# Patient Record
Sex: FEMALE | Race: WHITE | Employment: UNEMPLOYED | URBAN - METROPOLITAN AREA
[De-identification: names, ages, dates, MRNs, and addresses within clinical notes are randomized per-mention and may not be internally consistent; named-entity substitution may affect disease eponyms.]

---
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
## 2021-07-08 ENCOUNTER — HOSPITAL ENCOUNTER (EMERGENCY)
Age: 21
Discharge: HOME | End: 2021-07-08
Attending: EMERGENCY MEDICINE | Admitting: EMERGENCY MEDICINE

## 2021-07-08 ENCOUNTER — APPOINTMENT (OUTPATIENT)
Dept: GENERAL RADIOLOGY | Age: 21
End: 2021-07-08
Attending: PHYSICIAN ASSISTANT

## 2021-07-08 VITALS
WEIGHT: 160 LBS | BODY MASS INDEX: 30.21 KG/M2 | OXYGEN SATURATION: 100 % | TEMPERATURE: 99.2 F | HEART RATE: 84 BPM | RESPIRATION RATE: 18 BRPM | DIASTOLIC BLOOD PRESSURE: 72 MMHG | SYSTOLIC BLOOD PRESSURE: 117 MMHG | HEIGHT: 61 IN

## 2021-07-08 DIAGNOSIS — M79.675 TOE PAIN, LEFT: Primary | ICD-10-CM

## 2021-07-08 PROCEDURE — 99282 EMERGENCY DEPT VISIT SF MDM: CPT

## 2021-07-08 PROCEDURE — 73630 X-RAY EXAM OF FOOT: CPT

## 2021-07-08 RX ORDER — CEPHALEXIN 500 MG/1
500 CAPSULE ORAL 4 TIMES DAILY
Qty: 28 CAPSULE | Refills: 0 | Status: SHIPPED | OUTPATIENT
Start: 2021-07-08 | End: 2021-07-15

## 2021-07-08 NOTE — ED TRIAGE NOTES
Food Poisoning (Adult)  Food poisoning is illness that is passed along in food. It usually occurs from 1 to 24 hours after eating food that has spoiled.  Food poisoning can occur when you eat food or drink that contains viruses, bacteria, parasites, or to Pt ambulatory unassisted to triage with mask in place. Pt complains of L great toe pain. States her front door slammed on her toe x2 days ago. · Wash your hands after using cutting boards, counter-tops, and knives or other utensils that have been in contact with raw foods. · Wash and then peel fruits and vegetables. · Keep uncooked meats away from cooked and ready-to-eat foods.   · Use a food th · Plain noodles, rice, mashed potatoes, chicken noodle soup, or rice soup  · Unsweetened canned fruit (no pineapple)  · Bananas  As you recover:  · Limit fat intake to less than 15 grams per day.  Don’t eat margarine, butter, oils, mayonnaise, sauces, Karri Bible Gastritis is a painful inflammation of the stomach lining. It has a number of causes. Gastritis and its symptoms can be relieved with treatment. Work with your healthcare provider to find ways to treat your symptoms.   The Stomach  To digest the food you ea Nausea is feeling that you need to throw up. Throwing up occurs when your body forces food that is in your stomach out through your mouth. Nausea and vomiting are symptoms that are caused by many things.  They can happen when a condition or disease, medicin Nausea or vomiting can often be prevented or controlled with medicines (antiemetics). Your doctor may give you antiemetics before or after treatment if you are getting chemotherapy or other medical treatments that cause nausea or vomiting.   Eating tips  · · Soups. Clear broth. · Desserts. Plain gelatin, popsicles, and fruit juice bars. As you feel better, you may add 6 to 8 ounces of yogurt per day.  If you have diarrhea, don't have foods or drinks that contain sugar, high-fructose corn syrup, or sugar alco

## 2021-07-08 NOTE — Clinical Note
[address] [facility] EMERGENCY DEPT   [facility] [id number] [phone]    Work/School Note    Date: 7/8/2021    To Whom It May concern:      Amy Maravilla was seen and treated today in the emergency room by the following provider(s):  Attending Provider: Kathi Antonio MD  Physician Assistant: Carla Kumar. Mariana Arnold is excused from work/school on 07/08/21. She is clear to return to work/school on 07/09/21.         Sincerely,          DELGADO Patel

## 2021-07-08 NOTE — ED PROVIDER NOTES
Patient is a 50-year-old female who presents to the emergency room with chief complaint of left great toe pain after she hit it with the screen door at her home approximately 3 days ago. She cleaned the wound with hydrogen peroxide. Has had some increasing pain in the area. Described as throbbing. Worse with palpation, weightbearing. Denies fever, chills, foot pain, ankle pain. Past Medical History:   Diagnosis Date    ADD (attention deficit disorder) 1/19/2017    ADHD (attention deficit hyperactivity disorder) 1/19/2017    Anemia, iron deficiency 1/19/2017    H/O seasonal allergies     Pyuria     Scoliosis 1/19/2017    Seasonal allergies 1/19/2017       No past surgical history on file.       Family History:   Problem Relation Age of Onset    Diabetes Mother     Attention Deficit Hyperactivity Disorder Brother     Diabetes Maternal Aunt     Cancer Maternal Uncle         Lung Cancer    Other Other         Family Hx of Scoliosis    Diabetes Other         uncles and cousin have diabetes    Attention Deficit Hyperactivity Disorder Nephew     Attention Deficit Hyperactivity Disorder Sister        Social History     Socioeconomic History    Marital status: SINGLE     Spouse name: Not on file    Number of children: Not on file    Years of education: Not on file    Highest education level: Not on file   Occupational History    Not on file   Tobacco Use    Smoking status: Never Smoker    Smokeless tobacco: Never Used   Substance and Sexual Activity    Alcohol use: Not on file    Drug use: No    Sexual activity: Yes   Other Topics Concern    Not on file   Social History Narrative    Not on file     Social Determinants of Health     Financial Resource Strain:     Difficulty of Paying Living Expenses:    Food Insecurity:     Worried About [address] in the Last Year:     [address] in the Last Year:    Transportation Needs:     Lack of Transportation (Medical):    Miah Benedict Lack of Transportation (Non-Medical):    Physical Activity:     Days of Exercise per Week:     Minutes of Exercise per Session:    Stress:     Feeling of Stress :    Social Connections:     Frequency of Communication with Friends and Family:     Frequency of Social Gatherings with Friends and Family:     Attends Hinduism Services:     Active Member of Clubs or Organizations:     Attends Club or Organization Meetings:     Marital Status:    Intimate Partner Violence:     Fear of Current or Ex-Partner:     Emotionally Abused:     Physically Abused:     Sexually Abused: ALLERGIES: Patient has no known allergies. Review of Systems   Constitutional: Negative for chills and fever. HENT: Negative for facial swelling. Respiratory: Negative for chest tightness and shortness of breath. Cardiovascular: Negative for chest pain. Gastrointestinal: Negative for abdominal pain, nausea and vomiting. Musculoskeletal: Positive for arthralgias. Negative for myalgias. Neurological: Negative for headaches. Psychiatric/Behavioral: Negative for confusion. All other systems reviewed and are negative. Vitals:    07/08/21 1324   BP: 117/72   Pulse: 84   Resp: 18   Temp: 99.2 °F (37.3 °C)   SpO2: 100%   Weight: 72.6 kg (160 lb)   Height: 5' 1\" (1.549 m)            Physical Exam  Vitals and nursing note reviewed. Constitutional:       Appearance: Normal appearance. HENT:      Head: Normocephalic and atraumatic. Mouth/Throat:      Mouth: Mucous membranes are moist.   Eyes:      Pupils: Pupils are equal, round, and reactive to light. Cardiovascular:      Rate and Rhythm: Normal rate and regular rhythm. Pulmonary:      Effort: No respiratory distress. Breath sounds: Normal breath sounds. Abdominal:      Palpations: Abdomen is soft. Tenderness: There is no abdominal tenderness. There is no guarding. Feet:      Comments: Partially avulsed left great toenail.   Skin: General: Skin is warm and dry. Neurological:      Mental Status: She is alert and oriented to person, place, and time. Mental status is at baseline.    Psychiatric:         Mood and Affect: Mood normal.          MDM  Number of Diagnoses or Management Options  Toe pain, left: minor  Risk of Complications, Morbidity, and/or Mortality  Presenting problems: minimal  Diagnostic procedures: minimal  Management options: minimal    Patient Progress  Patient progress: improved         Procedures

## 2021-07-08 NOTE — ED NOTES
I have reviewed discharge instructions with the patient. The patient verbalized understanding. Patient left ED via Discharge Method: ambulatory to Home with self    Opportunity for questions and clarification provided. Patient given 1 scripts. To continue your aftercare when you leave the hospital, you may receive an automated call from our care team to check in on how you are doing. This is a free service and part of our promise to provide the best care and service to meet your aftercare needs.  If you have questions, or wish to unsubscribe from this service please call [phone]. Thank you for Choosing our [facility] Emergency Department.

## 2021-12-24 ENCOUNTER — HOSPITAL ENCOUNTER (EMERGENCY)
Age: 21
Discharge: HOME | End: 2021-12-24
Attending: EMERGENCY MEDICINE

## 2021-12-24 VITALS
SYSTOLIC BLOOD PRESSURE: 128 MMHG | WEIGHT: 160 LBS | RESPIRATION RATE: 16 BRPM | HEART RATE: 92 BPM | TEMPERATURE: 98.4 F | DIASTOLIC BLOOD PRESSURE: 84 MMHG | HEIGHT: 60 IN | OXYGEN SATURATION: 98 % | BODY MASS INDEX: 31.41 KG/M2

## 2021-12-24 DIAGNOSIS — J02.9 SORE THROAT: Primary | ICD-10-CM

## 2021-12-24 LAB
COVID-19 RAPID TEST, COVR: NOT DETECTED
SOURCE, COVRS: NORMAL
STREP,MOLECULAR STRPM: NOT DETECTED

## 2021-12-24 PROCEDURE — 99283 EMERGENCY DEPT VISIT LOW MDM: CPT

## 2021-12-24 PROCEDURE — 74011250637 HC RX REV CODE- 250/637: Performed by: NURSE PRACTITIONER

## 2021-12-24 PROCEDURE — 87635 SARS-COV-2 COVID-19 AMP PRB: CPT

## 2021-12-24 PROCEDURE — 87651 STREP A DNA AMP PROBE: CPT

## 2021-12-24 RX ORDER — IBUPROFEN 800 MG/1
800 TABLET ORAL
Status: COMPLETED | OUTPATIENT
Start: 2021-12-24 | End: 2021-12-24

## 2021-12-24 RX ADMIN — IBUPROFEN 800 MG: 800 TABLET, FILM COATED ORAL at 11:55

## 2021-12-24 NOTE — ED NOTES
I have reviewed discharge instructions with the patient. The patient verbalized understanding. Patient left ED via Discharge Method: ambulatory to Home with mother  Opportunity for questions and clarification provided. Patient given 0 scripts. To continue your aftercare when you leave the hospital, you may receive an automated call from our care team to check in on how you are doing. This is a free service and part of our promise to provide the best care and service to meet your aftercare needs.  If you have questions, or wish to unsubscribe from this service please call [phone]. Thank you for Choosing our [facility] Emergency Department.

## 2021-12-24 NOTE — Clinical Note
[address] [facility] EMERGENCY DEPT  [address] [facility] [address] [phone]    Work/School Note    Date: 12/24/2021    To Whom It May concern:    Amy Maravilla was seen and treated today in the emergency room by the following provider(s):  Attending Provider: Eulalia Brown MD  Nurse Practitioner: Joe Tucker NP. Stephany Baptiste is excused from work/school on 12/24/21 and 12/25/21. She is medically clear to return to work/school on 12/26/2021.        Sincerely,          Halie Charles NP

## 2021-12-24 NOTE — ED TRIAGE NOTES
Patient is ambulatory to triage with mask in place with complaint of sore throat since Wednesday. Patient is able to tolerate secretions. Patient denies Chest pain, SHOB, body aches, fever, and chills.

## 2021-12-24 NOTE — DISCHARGE INSTRUCTIONS
Take over-the-counter Tylenol or ibuprofen if needed for discomfort. Use warm salt water gargles, sore throat spray, or sore throat lozenges to help relieve sore throat. Return to the emergency department for any new, worsening, or concerning symptoms.

## 2021-12-24 NOTE — ED PROVIDER NOTES
81-year-old female who presents to the emergency department for complaint of sore throat since Wednesday. She also reports she has had a mild cough and body aches. She denies any difficulty swallowing, shortness of breath, body aches, fever, chills, nausea, vomiting, diarrhea, chest pain, or abdominal pain. The history is provided by the patient. Sore Throat  This is a new problem. The current episode started 2 days ago. The problem occurs constantly. The problem has not changed since onset. Pertinent negatives include no abdominal pain and no shortness of breath. She has tried nothing for the symptoms. Past Medical History:   Diagnosis Date    ADD (attention deficit disorder) 1/19/2017    ADHD (attention deficit hyperactivity disorder) 1/19/2017    Anemia, iron deficiency 1/19/2017    H/O seasonal allergies     Pyuria     Scoliosis 1/19/2017    Seasonal allergies 1/19/2017       No past surgical history on file.       Family History:   Problem Relation Age of Onset    Diabetes Mother     Attention Deficit Hyperactivity Disorder Brother     Diabetes Maternal Aunt     Cancer Maternal Uncle         Lung Cancer    Other Other         Family Hx of Scoliosis    Diabetes Other         uncles and cousin have diabetes    Attention Deficit Hyperactivity Disorder Nephew     Attention Deficit Hyperactivity Disorder Sister        Social History     Socioeconomic History    Marital status: SINGLE     Spouse name: Not on file    Number of children: Not on file    Years of education: Not on file    Highest education level: Not on file   Occupational History    Not on file   Tobacco Use    Smoking status: Never Smoker    Smokeless tobacco: Never Used   Substance and Sexual Activity    Alcohol use: Not on file    Drug use: No    Sexual activity: Yes   Other Topics Concern    Not on file   Social History Narrative    Not on file     Social Determinants of Health     Financial Resource Strain:  Difficulty of Paying Living Expenses: Not on file   Food Insecurity:     Worried About Running Out of Food in the Last Year: Not on file    Ran Out of Food in the Last Year: Not on file   Transportation Needs:     Lack of Transportation (Medical): Not on file    Lack of Transportation (Non-Medical): Not on file   Physical Activity:     Days of Exercise per Week: Not on file    Minutes of Exercise per Session: Not on file   Stress:     Feeling of Stress : Not on file   Social Connections:     Frequency of Communication with Friends and Family: Not on file    Frequency of Social Gatherings with Friends and Family: Not on file    Attends Adventist Services: Not on file    Active Member of [address] [place] [address] [employer] or Organizations: Not on file    Attends Club or Organization Meetings: Not on file    Marital Status: Not on file   Intimate Partner Violence:     Fear of Current or Ex-Partner: Not on file    Emotionally Abused: Not on file    Physically Abused: Not on file    Sexually Abused: Not on file   Housing Stability:     Unable to Pay for Housing in the Last Year: Not on file    Number of [place] in the Last Year: Not on file    Unstable Housing in the Last Year: Not on file         ALLERGIES: Patient has no known allergies. Review of Systems   Constitutional: Negative for chills and fever. HENT: Positive for rhinorrhea and sore throat. Respiratory: Positive for cough. Negative for shortness of breath. Gastrointestinal: Negative for abdominal pain, diarrhea, nausea and vomiting. Musculoskeletal: Positive for myalgias. All other systems reviewed and are negative. Vitals:    12/24/21 1035 12/24/21 1352   BP: 133/73 128/84   Pulse: 99 92   Resp: 18 16   Temp: 98.7 °F (37.1 °C) 98.4 °F (36.9 °C)   SpO2: 99% 98%   Weight: 72.6 kg (160 lb)    Height: 5' (1.524 m)             Physical Exam  Vitals and nursing note reviewed. Constitutional:       General: She is not in acute distress. Appearance: Normal appearance. She is well-developed. She is not ill-appearing, toxic-appearing or diaphoretic. HENT:      Head: Normocephalic and atraumatic. Right Ear: Tympanic membrane, ear canal and external ear normal.      Left Ear: Tympanic membrane, ear canal and external ear normal.      Mouth/Throat:      Mouth: Mucous membranes are moist.      Pharynx: Oropharynx is clear. Uvula midline. Posterior oropharyngeal erythema present. No pharyngeal swelling, oropharyngeal exudate or uvula swelling. Tonsils: No tonsillar exudate or tonsillar abscesses. Eyes:      General: No scleral icterus. Extraocular Movements: Extraocular movements intact. Conjunctiva/sclera: Conjunctivae normal.   Cardiovascular:      Rate and Rhythm: Normal rate. Heart sounds: Normal heart sounds. Pulmonary:      Effort: Pulmonary effort is normal. No respiratory distress. Abdominal:      General: Abdomen is flat. There is no distension. Musculoskeletal:         General: Normal range of motion. Cervical back: Normal range of motion and neck supple. No rigidity. Skin:     General: Skin is warm and dry. Capillary Refill: Capillary refill takes less than 2 seconds. Neurological:      General: No focal deficit present. Mental Status: She is alert and oriented to person, place, and time. Psychiatric:         Mood and Affect: Mood normal.         Behavior: Behavior normal.         Thought Content: Thought content normal.         Judgment: Judgment normal.          MDM  Number of Diagnoses or Management Options  Sore throat: new and requires workup  Diagnosis management comments: Overall well-appearing 50-year-old female who presents emergency department today with complaint of sore throat. Physical exam is unremarkable. Lung sounds are clear. Patient is speaking in full sentences and tolerating oral secretions without difficulty. Mild erythema noted to posterior oropharynx.   Covid and strep swabs are negative today. Suspicious for viral etiology. Supportive treatment discussed and encouraged. I have discussed the results of all labs, procedures, radiographs, and/or treatments with the patient and available family members. Erika Erickson is agreed upon by the patient and the patient is ready for discharge.  Questions about treatment in the ED and differential diagnosis of presenting condition were answered. Carlos Manuel Phelps was given verbal discharge instructions including, but not limited to, importance of returning to the emergency department for any concern of worsening or continued symptoms.  Instructions were given to follow up with a primary care provider or specialist within 1-2 days. Vanice Karlie effects of medications, if prescribed, were discussed and patient was advised to refrain from significant physical activity until followed up by primary care physician and to not drive or operate heavy machinery after taking any sedating substances.      Brenden Arellano NP; 12/24/2021 @4:47 PM Voice dictation software was used during the making of  this note. This software is not perfect and grammatical and other typographical errors  may be present. This note has not been proofread for errors.          Amount and/or Complexity of Data Reviewed  Clinical lab tests: reviewed    Risk of Complications, Morbidity, and/or Mortality  Presenting problems: low  Diagnostic procedures: low  Management options: low    Patient Progress  Patient progress: improved         Procedures

## 2022-01-21 ENCOUNTER — HOSPITAL ENCOUNTER (EMERGENCY)
Age: 22
Discharge: HOME | End: 2022-01-21
Attending: EMERGENCY MEDICINE
Payer: MEDICAID

## 2022-01-21 VITALS
SYSTOLIC BLOOD PRESSURE: 120 MMHG | TEMPERATURE: 97.9 F | WEIGHT: 173 LBS | HEIGHT: 61 IN | BODY MASS INDEX: 32.66 KG/M2 | HEART RATE: 74 BPM | DIASTOLIC BLOOD PRESSURE: 72 MMHG | OXYGEN SATURATION: 98 %

## 2022-01-21 DIAGNOSIS — U07.1 COVID-19: Primary | ICD-10-CM

## 2022-01-21 LAB
COVID-19 RAPID TEST, COVR: DETECTED
FLUAV AG NPH QL IA: NEGATIVE
FLUBV AG NPH QL IA: NEGATIVE
SOURCE, COVRS: ABNORMAL
SPECIMEN SOURCE: NORMAL

## 2022-01-21 PROCEDURE — 87635 SARS-COV-2 COVID-19 AMP PRB: CPT

## 2022-01-21 PROCEDURE — 87804 INFLUENZA ASSAY W/OPTIC: CPT

## 2022-01-21 PROCEDURE — 99282 EMERGENCY DEPT VISIT SF MDM: CPT

## 2022-01-21 NOTE — ED PROVIDER NOTES
HPI   Pleasant 27-year-old female presents to the ED with complaint of sore throat, nonproductive cough x5 days. Pleasant very well-appearing she appears to be in no acute distress. Talkative laughing in the ED. She denies difficulty swallowing, despite that written in triage note by triage nurse. States she has no difficulty swallowing, is eating and drinking normally, has increased pain with swallowing. No drooling, no hoarseness no voice change. Denies difficulty breathing, shortness of breath, chest pain or tightness, neck pain or stiffness, ear pain, facial pain, rhinorrhea or nasal congestion, nausea or vomiting, abdominal pain, diarrhea, urinary complaint, concern for pregnancy. Afebrile and hemodynamically stable. Past Medical History:   Diagnosis Date    ADD (attention deficit disorder) 1/19/2017    ADHD (attention deficit hyperactivity disorder) 1/19/2017    Anemia, iron deficiency 1/19/2017    H/O seasonal allergies     Pyuria     Scoliosis 1/19/2017    Seasonal allergies 1/19/2017       No past surgical history on file.       Family History:   Problem Relation Age of Onset    Diabetes Mother     Attention Deficit Hyperactivity Disorder Brother     Diabetes Maternal Aunt     Cancer Maternal Uncle         Lung Cancer    Other Other         Family Hx of Scoliosis    Diabetes Other         uncles and cousin have diabetes    Attention Deficit Hyperactivity Disorder Nephew     Attention Deficit Hyperactivity Disorder Sister        Social History     Socioeconomic History    Marital status: SINGLE     Spouse name: Not on file    Number of children: Not on file    Years of education: Not on file    Highest education level: Not on file   Occupational History    Not on file   Tobacco Use    Smoking status: Never Smoker    Smokeless tobacco: Never Used   Substance and Sexual Activity    Alcohol use: Not on file    Drug use: No    Sexual activity: Yes   Other Topics Concern    Not on file   Social History Narrative    Not on file     Social Determinants of Health     Financial Resource Strain:     Difficulty of Paying Living Expenses: Not on file   Food Insecurity:     Worried About Running Out of Food in the Last Year: Not on file    Herrera of Food in the Last Year: Not on file   Transportation Needs:     Lack of Transportation (Medical): Not on file    Lack of Transportation (Non-Medical): Not on file   Physical Activity:     Days of Exercise per Week: Not on file    Minutes of Exercise per Session: Not on file   Stress:     Feeling of Stress : Not on file   Social Connections:     Frequency of Communication with Friends and Family: Not on file    Frequency of Social Gatherings with Friends and Family: Not on file    Attends Methodist Services: Not on file    Active Member of [address] [place] [address] [employer] or Organizations: Not on file    Attends Club or Organization Meetings: Not on file    Marital Status: Not on file   Intimate Partner Violence:     Fear of Current or Ex-Partner: Not on file    Emotionally Abused: Not on file    Physically Abused: Not on file    Sexually Abused: Not on file   Housing Stability:     Unable to Pay for Housing in the Last Year: Not on file    Number of [place] in the Last Year: Not on file    Unstable Housing in the Last Year: Not on file         ALLERGIES: Patient has no known allergies. Review of Systems  Constitutional: Negative for fever. Negative for appetite change, chills, diaphoresis and unexpected weight change. HENT: As in HPI     Eyes: Negative. Respiratory: As in HPI   Cardiovascular: Negative. GI/: As in HPI      Musculoskeletal: As in HPI   Skin: Negative. Allergic/Immunologic: Negative. Neurological: Negative. Visit Vitals  /72   Pulse 74   Temp 97.9 °F (36.6 °C)   Ht 5' 1\" (1.549 m)   Wt 78.5 kg (173 lb)   SpO2 98%   BMI 32.69 kg/m²         Physical:   Constitutional: Oriented to person, place, and time. Appears well-developed and well-nourished. No distress. HENT:    Head: Normocephalic and atraumatic. No tenderness/facial tenderness. Right Ear: External ear normal. Non tender, no erythema/exudates. Left Ear: External ear normal.  Non tender, no erythema/exudates. Nose: Nose normal. Pink/moist mucosa. Clear rhinorrhea. Mouth/Throat: Mouth normal. Uvula midline, no erythema/exudates/edema. No drooling, no voice change, no tender/enlarged nodes, no swelling. No pain with ROM of neck. Eyes: Conjunctivae are normal. Pupils are equal, round, and reactive to light. Neck: Supple. No tracheal deviation. Not tender, not rigid, no menningeal signs. Cardiovascular: Normal rate, intact distal pulses. Brisk capillary refill intact, less than 2 seconds. Regular rhythm present. No murmer, no friction rub heard. Pulmonary/Chest: Lungs are clear & equal bilaterally. No diminished sounds, no adventitious sounds. No respiratory distress. Abdominal: Soft. Normoactive bowel sounds. There is no tenderness/distension/rigidity. No flank/CVA tenderness. Musculoskeletal: No obvious deformity, erythema, edema. Neurological: Alert and oriented to person, place, and time. No numbness/tingling. No loss of sensation. Positive PMS ×4. GCS= 15. Skin: Skin is warm and dry. Capillary refill takes less than 2 seconds. No abrasion, no lesion, no petechiae and no rash noted. Not diaphoretic. No cyanosis, erythema, or pallor. Psychiatric: Normal mood and affect. Behavior is normal.    Nursing note and vitals reviewed. MDM:   HPI as above. Pt neck is supple, no meningeal signs. No rash, sore throat, nausea/vomiting or abdominal pain. No oropharyngeal edema/erythema/exudates. Uvula midline, no visualized PTA, no throat pain with range of motion of neck, no tender or enlarged lymph nodes, lungs are clear to auscultation, no diminished sounds. Abdomen is soft and not tender. Denies urinary complaint. . Low suspicion of deep space infection, RPA/PTA, strep pharyngitis, influenza, encephalitis, meningitis, bacteremia, pneumonia, CA, myocarditis, PE/DVT, ACS, COPD exacerbation, pyelonephritis, ureteral stone, ectopic pregnancy other emergent process. Symptoms likely related to viral URI, with concern given for COVID-19 infection. Testing positive for COVID-19 in the ED at this time . advised on therapeutic measures, given very strict return to ED for care instructions, self-quarantine per CDC guidelines. Strict return to ED for care instruction provided. Advised to follow-up with PCP in 2-3 days. Return to ED immediately for any new, worsening, concerning symptoms. Patient is very well-hydrated appearing, no distress, pleasant and conversational.  Nontoxic-appearing, tolerating oral intake. Patient is hemodynamically stable and in no acute distress. Patient is not ill-appearing. All findings and plans were discussed with the patient. Patient verbalizes desire to be discharged home at this time. All questions answered. Discussed with the patient that an unremarkable evaluation in the ED does not preclude the development or presence of a serious or life threatening condition. Patient was instructed to return immediately for any worsening or change in current symptoms, or if symptoms do not continue to improve. I instructed them to follow up with their primary care provider, own specialist, or medical provider that I am recommending for him within the next 2-3 days     the patient acknowledged understanding plan of care and affirmed approval. Patient is discharged home, with no further complaint. Plan: Discharge home, with close follow-up with primary care.            Dx: [id number]    Disposition: Discharged             Procedures

## 2022-01-21 NOTE — ED TRIAGE NOTES
Patient arrives with CC of sore throat for 5 days with non productive cough. Mild difficulty swallowing. Mucinex and theraflu at home without relief. Covid-19 10 Myron with negative test. Not tested for flu at this time.

## 2022-01-21 NOTE — Clinical Note
[address] [facility] EMERGENCY DEPT   [facility]  [address] [facility] [address] [phone]    Work/School Note    Date: 1/21/2022     To Whom It May concern:    Amy Maravilla was evaluated by the following provider(s):  Attending Provider: Tete Shrestha MD  Nurse Practitioner: Yony Angelo NP.   Levreilly Les virus is suspected. Per the CDC guidelines we recommend home isolation until the following conditions are all met:    1. At least five days have passed since symptoms first appeared and/or had a close exposure,   2. After home isolation for five days, wearing a mask around others for the next five days,  3. At least 24 have passed since last fever without the use of fever-reducing medications and  4.  Symptoms (eg cough, shortness of breath) have improved      Sincerely,          Magdiel Patel RN

## 2022-01-21 NOTE — Clinical Note
[address] [facility] EMERGENCY DEPT   [facility] [id number] [phone]    Work/School Note    Date: 1/21/2022     To Whom It May concern:    Amy Maravilla was evaluated by the following provider(s):  Attending Provider: Julio Patten MD  Nurse Practitioner: Aurelio Das NP.   Misty Kimball virus is suspected. Per the CDC guidelines we recommend home isolation until the following conditions are all met:    1. At least five days have passed since symptoms first appeared and/or had a close exposure,   2. After home isolation for five days, wearing a mask around others for the next five days,  3. At least 24 have passed since last fever without the use of fever-reducing medications and  4.  Symptoms (eg cough, shortness of breath) have improved      Sincerely,          Roxanne Eric NP

## 2022-01-21 NOTE — ED NOTES
I have reviewed discharge instructions with the patient. The patient verbalized understanding. Patient left ED via Discharge Method: ambulatory to Home with self. Opportunity for questions and clarification provided. Patient given 0 scripts. To continue your aftercare when you leave the hospital, you may receive an automated call from our care team to check in on how you are doing. This is a free service and part of our promise to provide the best care and service to meet your aftercare needs.  If you have questions, or wish to unsubscribe from this service please call [phone]. Thank you for Choosing our [facility] Emergency Department.

## 2022-01-21 NOTE — DISCHARGE INSTRUCTIONS
Patient Education        COVID-19 Viral Test: About This Test  What is it? A COVID-19 viral test is a way to find out if you have COVID-19. The test looks for the virus in your breathing passages. There are different types of viral tests. One type looks for genetic material from the virus. This is usually called polymerase chain reaction (PCR). Another type looks for proteins on the virus. This is usually called an antigen test. It may not be as accurate as PCR. Some test results come back in a few minutes. Others may take a few days. Why is it done? This test is used to diagnose a current infection with SARS-CoV-2, the virus that causes COVID-19. Knowing that you have the virus means that you can take steps to protect others from getting infected. This can help limit the spread of the virus. Knowing who has COVID-19 is also important for experts who track the virus. How do you prepare for the test?  You don't need to do anything to prepare for this test. But be sure to follow any instructions your health care provider gives you. How is it done? The test is most often done on a sample from your nose or throat. It's sometimes done on a sample of saliva. One way a sample is collected is by putting a long swab into the back of your nose. Samples can be tested in different ways to look for an infection. What should you do while you wait for your test results? If you are being tested because you've been exposed to COVID-19 or have been sick from COVID-19, you will want to know what to do while you wait for your test results. While you wait for the results of your COVID-19 test, stay in the place where you live, and stay away from others. Do this even if you don't feel sick or have any symptoms. Don't leave unless you need medical care. If you can, try to stay in a separate room. This might help you avoid infecting family members or other people you live with.   Follow your doctor's instructions about what to do when you get your results back. Be sure to wear a mask and follow social-distancing guidelines after you get your results, even if the test is negative. If you are fully vaccinated, you may not need to follow these instructions. Ask your doctor if you have questions. What do your results mean? The result is either positive or negative. A positive result means that the antigen or the genetic material of the virus was found in your sample. You have COVID-19 now. A negative result means that the antigen or the genetic material was not found. This may mean that you don't have COVID-19. But it's possible to get a \"false-negative\" result. This means that the test shows that you don't have COVID-19 when in fact you do. This may happen because you were tested too soon after you were infected, before the virus started to spread in your nose and throat. Or it could happen because the swab missed the infection. If you get a negative result for an antigen test, your doctor may recommend that you get another test, such as polymerase chain reaction (PCR), to make sure you don't have the virus. In general, PCR is more accurate than an antigen test.  Some test results come back in a few minutes. Others may take a few days. If your test is negative, follow your doctor's advice for when you can go back to activities. If your test is positive, talk to your doctor or a public health official about what you need to do. Where can you learn more? Go to http://www.gray.com/  Enter A129 in the search box to learn more about \"COVID-19 Viral Test: About This Test.\"  Current as of: March 26, 2021               Content Version: 13.0  © 5127-1910 [employer]. Care instructions adapted under license by [employer] (which disclaims liability or warranty for this information).  If you have questions about a medical condition or this instruction, always ask your healthcare professional. [employer] disclaims any warranty or liability for your use of this information. Return to the ED immediately for any new, worsening, or concerning symptoms, or danger signs as we discussed. Otherwise, follow up with your PCP in 1-2 days for reevaluation.

## 2022-03-18 PROBLEM — F90.9 ADHD (ATTENTION DEFICIT HYPERACTIVITY DISORDER): Status: ACTIVE | Noted: 2017-01-19

## 2022-03-19 PROBLEM — D50.9 ANEMIA, IRON DEFICIENCY: Status: ACTIVE | Noted: 2017-01-19

## 2022-03-19 PROBLEM — F98.8 ADD (ATTENTION DEFICIT DISORDER): Status: ACTIVE | Noted: 2017-01-19

## 2022-03-19 PROBLEM — M41.9 SCOLIOSIS: Status: ACTIVE | Noted: 2017-01-19

## 2022-05-08 ENCOUNTER — HOSPITAL ENCOUNTER (EMERGENCY)
Age: 22
Discharge: HOME | End: 2022-05-08
Attending: EMERGENCY MEDICINE
Payer: MEDICAID

## 2022-05-08 VITALS
DIASTOLIC BLOOD PRESSURE: 83 MMHG | SYSTOLIC BLOOD PRESSURE: 129 MMHG | OXYGEN SATURATION: 100 % | TEMPERATURE: 98.5 F | RESPIRATION RATE: 18 BRPM | HEART RATE: 94 BPM

## 2022-05-08 DIAGNOSIS — J02.0 STREP THROAT: Primary | ICD-10-CM

## 2022-05-08 DIAGNOSIS — R11.2 NAUSEA AND VOMITING, UNSPECIFIED VOMITING TYPE: ICD-10-CM

## 2022-05-08 LAB
BILIRUB UR QL: ABNORMAL
GLUCOSE UR QL STRIP.AUTO: NEGATIVE MG/DL
KETONES UR-MCNC: 80 MG/DL
LEUKOCYTE ESTERASE UR QL STRIP: NEGATIVE
NITRITE UR QL: NEGATIVE
PH UR: 5.5 [PH] (ref 5–9)
PROT UR QL: 30 MG/DL
RBC # UR STRIP: ABNORMAL /UL
SERVICE CMNT-IMP: ABNORMAL
SP GR UR: >1.03 (ref 1–1.02)
UROBILINOGEN UR QL: 0.2 EU/DL (ref 0.2–1)

## 2022-05-08 PROCEDURE — 74011250637 HC RX REV CODE- 250/637: Performed by: PHYSICIAN ASSISTANT

## 2022-05-08 PROCEDURE — 99283 EMERGENCY DEPT VISIT LOW MDM: CPT

## 2022-05-08 PROCEDURE — 81003 URINALYSIS AUTO W/O SCOPE: CPT

## 2022-05-08 RX ORDER — ONDANSETRON 4 MG/1
4 TABLET, ORALLY DISINTEGRATING ORAL
Qty: 16 TABLET | Refills: 0 | Status: SHIPPED | OUTPATIENT
Start: 2022-05-08

## 2022-05-08 RX ORDER — AZITHROMYCIN 250 MG/1
TABLET, FILM COATED ORAL
Qty: 6 TABLET | Refills: 0 | Status: SHIPPED | OUTPATIENT
Start: 2022-05-08 | End: 2022-05-13

## 2022-05-08 RX ORDER — ONDANSETRON 4 MG/1
4 TABLET, ORALLY DISINTEGRATING ORAL
Status: COMPLETED | OUTPATIENT
Start: 2022-05-08 | End: 2022-05-08

## 2022-05-08 RX ADMIN — ONDANSETRON 4 MG: 4 TABLET, ORALLY DISINTEGRATING ORAL at 14:32

## 2022-05-08 NOTE — ED NOTES
I have reviewed discharge instructions with the patient. The patient verbalized understanding. Patient left ED via Discharge Method: ambulatory to Home with self    Opportunity for questions and clarification provided. Patient given 2 scripts. To continue your aftercare when you leave the hospital, you may receive an automated call from our care team to check in on how you are doing. This is a free service and part of our promise to provide the best care and service to meet your aftercare needs.  If you have questions, or wish to unsubscribe from this service please call [phone]. Thank you for Choosing our [facility] Emergency Department.

## 2022-05-08 NOTE — ED PROVIDER NOTES
Patient presents to the emergency department stating that this past week 3 of the kids who live in her home all had strep throat.  2 days ago she developed a sore throat and chills. Yesterday evening she began vomiting. She states she has had 7 episodes of vomiting today and no diarrhea and is still having a sore throat. Patient's last menstrual cycle was February 21 however she states she recently started a new birth control which she takes continuous active pills. Patient denies any urinary symptoms. She denies any cough or shortness of breath           Past Medical History:   Diagnosis Date    ADD (attention deficit disorder) 1/19/2017    ADHD (attention deficit hyperactivity disorder) 1/19/2017    Anemia, iron deficiency 1/19/2017    H/O seasonal allergies     Pyuria     Scoliosis 1/19/2017    Seasonal allergies 1/19/2017       No past surgical history on file.       Family History:   Problem Relation Age of Onset    Diabetes Mother     Attention Deficit Hyperactivity Disorder Brother     Diabetes Maternal Aunt     Cancer Maternal Uncle         Lung Cancer    Other Other         Family Hx of Scoliosis    Diabetes Other         uncles and cousin have diabetes    Attention Deficit Hyperactivity Disorder Nephew     Attention Deficit Hyperactivity Disorder Sister        Social History     Socioeconomic History    Marital status: SINGLE     Spouse name: Not on file    Number of children: Not on file    Years of education: Not on file    Highest education level: Not on file   Occupational History    Not on file   Tobacco Use    Smoking status: Never Smoker    Smokeless tobacco: Never Used   Substance and Sexual Activity    Alcohol use: Not on file    Drug use: No    Sexual activity: Yes   Other Topics Concern    Not on file   Social History Narrative    Not on file     Social Determinants of Health     Financial Resource Strain:     Difficulty of Paying Living Expenses: Not on file Food Insecurity:     Worried About Running Out of Food in the Last Year: Not on file    Herrera of Food in the Last Year: Not on file   Transportation Needs:     Lack of Transportation (Medical): Not on file    Lack of Transportation (Non-Medical): Not on file   Physical Activity:     Days of Exercise per Week: Not on file    Minutes of Exercise per Session: Not on file   Stress:     Feeling of Stress : Not on file   Social Connections:     Frequency of Communication with Friends and Family: Not on file    Frequency of Social Gatherings with Friends and Family: Not on file    Attends Taoist Services: Not on file    Active Member of [address] [place] [address] [email or web address] or Organizations: Not on file    Attends Club or Organization Meetings: Not on file    Marital Status: Not on file   Intimate Partner Violence:     Fear of Current or Ex-Partner: Not on file    Emotionally Abused: Not on file    Physically Abused: Not on file    Sexually Abused: Not on file   Housing Stability:     Unable to Pay for Housing in the Last Year: Not on file    Number of [place] in the Last Year: Not on file    Unstable Housing in the Last Year: Not on file         ALLERGIES: Patient has no known allergies. Review of Systems   Constitutional: Positive for chills and fatigue. HENT: Positive for sore throat. Gastrointestinal: Positive for nausea and vomiting. All other systems reviewed and are negative. Vitals:    05/08/22 1338   BP: 129/83   Pulse: 94   Resp: 18   Temp: 98.5 °F (36.9 °C)   SpO2: 100%            Physical Exam  Vitals and nursing note reviewed. Constitutional:       Appearance: She is well-developed. HENT:      Head: Normocephalic and atraumatic. Right Ear: Tympanic membrane normal.      Left Ear: Tympanic membrane normal.      Nose: Nose normal.      Mouth/Throat:      Mouth: Mucous membranes are moist.      Comments: Has mild tonsillar swelling and erythema. No ulcerations or exudates noted.   Uvula is midline  Eyes:      Extraocular Movements: Extraocular movements intact. Pupils: Pupils are equal, round, and reactive to light. Cardiovascular:      Rate and Rhythm: Normal rate and regular rhythm. Pulses: Normal pulses. Heart sounds: Normal heart sounds. Pulmonary:      Effort: Pulmonary effort is normal.      Breath sounds: Normal breath sounds. Abdominal:      General: Abdomen is flat. Palpations: Abdomen is soft. Comments: minimall suprapubic tenderness to palpation   Musculoskeletal:         General: Normal range of motion. Cervical back: Normal range of motion and neck supple. Skin:     General: Skin is warm and dry. Capillary Refill: Capillary refill takes less than 2 seconds. Neurological:      General: No focal deficit present. Mental Status: She is alert. Psychiatric:         Mood and Affect: Mood normal.         Behavior: Behavior normal.         Thought Content: Thought content normal.          MDM  Number of Diagnoses or Management Options  Diagnosis management comments: Differential diagnoses: Strep throat, urinary tract infection, dehydration    Patient's urine pregnancy test is negative. She does have some blood in her urine dip but states to me her menstrual cycle should be starting now and thinks that is probably why.   I will go ahead and treat patient presumptively for strep throat with Zithromax since multiple family members at home are positive       Amount and/or Complexity of Data Reviewed  Clinical lab tests: reviewed    Risk of Complications, Morbidity, and/or Mortality  Presenting problems: low  Diagnostic procedures: low  Management options: low    Patient Progress  Patient progress: improved         Procedures

## 2022-05-08 NOTE — ED TRIAGE NOTES
Pt ambulatory to triage. Pt states that last week she was around 3 kids who all had strep throat. Pt reports that a few days ago she had a sore throat and then yesterday began vomiting. Pt states that she started penicillin 24 hours ago, has taken 2 doses. Pt denies fever/chills, denies dysuria.

## 2022-11-25 ENCOUNTER — APPOINTMENT (OUTPATIENT)
Dept: GENERAL RADIOLOGY | Age: 22
End: 2022-11-25

## 2022-11-25 ENCOUNTER — HOSPITAL ENCOUNTER (EMERGENCY)
Age: 22
Discharge: HOME | End: 2022-11-25

## 2022-11-25 VITALS
DIASTOLIC BLOOD PRESSURE: 80 MMHG | RESPIRATION RATE: 16 BRPM | BODY MASS INDEX: 33.38 KG/M2 | HEIGHT: 60 IN | OXYGEN SATURATION: 100 % | WEIGHT: 170 LBS | SYSTOLIC BLOOD PRESSURE: 143 MMHG | TEMPERATURE: 98.6 F | HEART RATE: 72 BPM

## 2022-11-25 DIAGNOSIS — S60.011A CONTUSION OF RIGHT THUMB WITHOUT DAMAGE TO NAIL, INITIAL ENCOUNTER: Primary | ICD-10-CM

## 2022-11-25 PROCEDURE — 73140 X-RAY EXAM OF FINGER(S): CPT

## 2022-11-25 PROCEDURE — 99283 EMERGENCY DEPT VISIT LOW MDM: CPT

## 2022-11-25 RX ORDER — IBUPROFEN 600 MG/1
600 TABLET ORAL
Status: DISCONTINUED | OUTPATIENT
Start: 2022-11-25 | End: 2022-11-25 | Stop reason: HOSPADM

## 2022-11-25 RX ORDER — HYDROCODONE BITARTRATE AND ACETAMINOPHEN 7.5; 325 MG/1; MG/1
1 TABLET ORAL
Status: DISCONTINUED | OUTPATIENT
Start: 2022-11-25 | End: 2022-11-25 | Stop reason: HOSPADM

## 2022-11-25 RX ORDER — TETANUS AND DIPHTHERIA TOXOIDS ADSORBED 2; 2 [LF]/.5ML; [LF]/.5ML
0.5 INJECTION INTRAMUSCULAR ONCE
Status: DISCONTINUED | OUTPATIENT
Start: 2022-11-25 | End: 2022-11-25 | Stop reason: HOSPADM

## 2022-11-25 RX ORDER — IBUPROFEN 600 MG/1
600 TABLET ORAL 4 TIMES DAILY PRN
Qty: 20 TABLET | Refills: 0 | Status: SHIPPED | OUTPATIENT
Start: 2022-11-25

## 2022-11-25 ASSESSMENT — PAIN DESCRIPTION - LOCATION: LOCATION: FINGER (COMMENT WHICH ONE)

## 2022-11-25 ASSESSMENT — PAIN DESCRIPTION - ORIENTATION: ORIENTATION: RIGHT

## 2022-11-25 ASSESSMENT — ENCOUNTER SYMPTOMS
SHORTNESS OF BREATH: 0
GASTROINTESTINAL NEGATIVE: 1
EYES NEGATIVE: 1
RESPIRATORY NEGATIVE: 1

## 2022-11-25 ASSESSMENT — PAIN - FUNCTIONAL ASSESSMENT: PAIN_FUNCTIONAL_ASSESSMENT: 0-10

## 2022-11-25 ASSESSMENT — PAIN SCALES - GENERAL: PAINLEVEL_OUTOF10: 10

## 2022-11-25 ASSESSMENT — PAIN DESCRIPTION - FREQUENCY: FREQUENCY: CONTINUOUS
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
## 2022-11-25 NOTE — ED NOTES
Pt left before receiving discharge treatment and instructions. Provider and charge nurse aware.       Wendie Malik RN  11/25/22 9029

## 2022-11-25 NOTE — ED NOTES
Pt called again and unable locate. Registration unable to locate on multiple attempts.       Daren Hemphill RN  11/25/22 1749

## 2022-11-25 NOTE — ED NOTES
Attempted to call pt for medications and unable to locate pt in waiting area.       Edmar Thomas RN  11/25/22 1354

## 2023-04-15 ENCOUNTER — HOSPITAL ENCOUNTER (EMERGENCY)
Age: 23
Discharge: HOME | End: 2023-04-15
Attending: EMERGENCY MEDICINE
Payer: MEDICAID

## 2023-04-15 VITALS
HEART RATE: 86 BPM | WEIGHT: 163 LBS | RESPIRATION RATE: 16 BRPM | BODY MASS INDEX: 32 KG/M2 | OXYGEN SATURATION: 99 % | HEIGHT: 60 IN | TEMPERATURE: 98.6 F | DIASTOLIC BLOOD PRESSURE: 66 MMHG | SYSTOLIC BLOOD PRESSURE: 123 MMHG

## 2023-04-15 DIAGNOSIS — R10.9 ABDOMINAL CRAMPING: Primary | ICD-10-CM

## 2023-04-15 PROCEDURE — 99282 EMERGENCY DEPT VISIT SF MDM: CPT

## 2023-04-15 ASSESSMENT — PAIN DESCRIPTION - LOCATION: LOCATION: ABDOMEN

## 2023-04-15 ASSESSMENT — PAIN - FUNCTIONAL ASSESSMENT: PAIN_FUNCTIONAL_ASSESSMENT: 0-10

## 2023-04-15 ASSESSMENT — PAIN SCALES - GENERAL: PAINLEVEL_OUTOF10: 7

## 2023-04-15 ASSESSMENT — PAIN DESCRIPTION - ORIENTATION: ORIENTATION: LOWER;LEFT

## 2023-10-06 ENCOUNTER — HOSPITAL ENCOUNTER (EMERGENCY)
Age: 23
Discharge: HOME | End: 2023-10-06
Payer: MEDICAID

## 2023-10-06 VITALS
BODY MASS INDEX: 35.87 KG/M2 | WEIGHT: 190 LBS | OXYGEN SATURATION: 97 % | HEART RATE: 87 BPM | TEMPERATURE: 98.2 F | DIASTOLIC BLOOD PRESSURE: 80 MMHG | RESPIRATION RATE: 18 BRPM | HEIGHT: 61 IN | SYSTOLIC BLOOD PRESSURE: 135 MMHG

## 2023-10-06 DIAGNOSIS — T30.4 CHEMICAL BURN: Primary | ICD-10-CM

## 2023-10-06 PROCEDURE — 99283 EMERGENCY DEPT VISIT LOW MDM: CPT

## 2023-10-06 ASSESSMENT — PAIN DESCRIPTION - LOCATION: LOCATION: LEG

## 2023-10-06 ASSESSMENT — PAIN SCALES - GENERAL: PAINLEVEL_OUTOF10: 7

## 2023-10-06 ASSESSMENT — LIFESTYLE VARIABLES
HOW MANY STANDARD DRINKS CONTAINING ALCOHOL DO YOU HAVE ON A TYPICAL DAY: PATIENT DOES NOT DRINK
HOW OFTEN DO YOU HAVE A DRINK CONTAINING ALCOHOL: NEVER

## 2023-10-06 ASSESSMENT — PAIN DESCRIPTION - ORIENTATION: ORIENTATION: RIGHT
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
## 2023-10-06 NOTE — DISCHARGE INSTRUCTIONS
Use cream as prescribed. Monitor for signs of infection. Return to the ER for any new, worsening, or concerning symptoms.

## 2024-11-11 ENCOUNTER — APPOINTMENT (OUTPATIENT)
Dept: GENERAL RADIOLOGY | Age: 24
End: 2024-11-11
Payer: COMMERCIAL

## 2024-11-11 ENCOUNTER — HOSPITAL ENCOUNTER (EMERGENCY)
Age: 24
Discharge: HOME | End: 2024-11-11
Payer: COMMERCIAL

## 2024-11-11 VITALS
HEIGHT: 62 IN | HEART RATE: 80 BPM | RESPIRATION RATE: 18 BRPM | OXYGEN SATURATION: 97 % | SYSTOLIC BLOOD PRESSURE: 119 MMHG | DIASTOLIC BLOOD PRESSURE: 69 MMHG | BODY MASS INDEX: 34.78 KG/M2 | WEIGHT: 189 LBS | TEMPERATURE: 98.4 F

## 2024-11-11 DIAGNOSIS — S82.839A AVULSION FRACTURE OF DISTAL FIBULA: ICD-10-CM

## 2024-11-11 PROCEDURE — 99283 EMERGENCY DEPT VISIT LOW MDM: CPT

## 2024-11-11 PROCEDURE — 73610 X-RAY EXAM OF ANKLE: CPT

## 2024-11-11 PROCEDURE — 29515 APPLICATION SHORT LEG SPLINT: CPT

## 2024-11-11 ASSESSMENT — LIFESTYLE VARIABLES
HOW OFTEN DO YOU HAVE A DRINK CONTAINING ALCOHOL: NEVER
HOW MANY STANDARD DRINKS CONTAINING ALCOHOL DO YOU HAVE ON A TYPICAL DAY: PATIENT DOES NOT DRINK

## 2024-11-11 ASSESSMENT — PAIN - FUNCTIONAL ASSESSMENT: PAIN_FUNCTIONAL_ASSESSMENT: 0-10

## 2024-11-11 ASSESSMENT — PAIN DESCRIPTION - ORIENTATION: ORIENTATION: RIGHT

## 2024-11-11 ASSESSMENT — PAIN DESCRIPTION - LOCATION: LOCATION: ANKLE

## 2024-11-11 ASSESSMENT — PAIN SCALES - GENERAL: PAINLEVEL_OUTOF10: 8

## 2024-11-11 NOTE — ED TRIAGE NOTES
Pt reports kicking dog and hearing ankle make popping noise. Pt reports not being able to put full weight on foot.

## 2024-11-11 NOTE — DISCHARGE INSTRUCTIONS
Rest, ice, elevate, avoid painful activities. ED if worse. Follow up with Ortho for recheck.  Use crutches to help keep your weight off of the foot.  Use the splint for comfort.  No driving until the splint is removed and you are released from Ortho and able to do so since this is your right ankle.  Use over-the-counter ibuprofen and/or Tylenol as directed if needed for pain.

## 2024-11-11 NOTE — ED PROVIDER NOTES
Procedures    Orders Placed This Encounter   Procedures    XR ANKLE RIGHT (MIN 3 VIEWS)    [facility]    Application short leg splint    [facility] Crutches (); Pair, Right Side Injury; Med (5'2\"-5'10\")        Medications given during this emergency department visit:  Medications - No data to display    New Prescriptions    No medications on file        Past Medical History:   Diagnosis Date    ADD (attention deficit disorder) 1/19/2017    ADHD (attention deficit hyperactivity disorder) 1/19/2017    Anemia, iron deficiency 1/19/2017    H/O seasonal allergies 1/19/2017    H/O seasonal allergies     Pyuria     Scoliosis 1/19/2017        No past surgical history on file.     Social History     Socioeconomic History    Marital status: Single   Tobacco Use    Smoking status: Never    Smokeless tobacco: Never   Substance and Sexual Activity    Drug use: No     Social Determinants of Health     Financial Resource Strain: Low Risk  (3/27/2023)    Received from [email or web address]    Financial Resource Strain     Difficulty Paying Living Expenses: Not hard at all     Difficulty Paying Medical Expenses: No   Food Insecurity: No Food Insecurity (3/27/2023)    Received from [email or web address]    Food Insecurity     Worried about Running Out of Food in the Last Year: Never true     Ran Out of Food in the Last Year: Never true   Transportation Needs: No Transportation Needs (3/27/2023)    Received from [email or web address]    Transportation Needs     Lack of Transportation: No   Physical Activity: Inactive (3/27/2023)    Received from [email or web address]    Physical Activity     Days of Exercise per Week: 0     Minutes of Exercise per Session: 0     Total Minutes of Exercise per Week: 0   Stress: No Stress Concern Present (10/29/2024)    Received from [employer]    Stress     Feeling of Stress : Only a little   Social Connections:

## 2024-11-11 NOTE — ED NOTES
I have reviewed discharge instructions with the patient.  The patient verbalized understanding.    Patient left ED via Discharge Method: ambulatory to Home with spouse.    Opportunity for questions and clarification provided.       Patient given 0 scripts.         To continue your aftercare when you leave the hospital, you may receive an automated call from our care team to check in on how you are doing.  This is a free service and part of our promise to provide the best care and service to meet your aftercare needs.” If you have questions, or wish to unsubscribe from this service please call [phone].  Thank you for Choosing our [facility] Emergency Department.        Kellen Mosher LPN  11/11/24 1142

## 2024-11-12 ENCOUNTER — TELEPHONE (OUTPATIENT)
Dept: ORTHOPEDIC SURGERY | Age: 24
End: 2024-11-12

## 2024-11-15 ENCOUNTER — OFFICE VISIT (OUTPATIENT)
Dept: ORTHOPEDIC SURGERY | Age: 24
End: 2024-11-15

## 2024-11-15 VITALS — WEIGHT: 189 LBS | BODY MASS INDEX: 34.78 KG/M2 | HEIGHT: 62 IN

## 2024-11-15 DIAGNOSIS — S82.839A AVULSION FRACTURE OF DISTAL FIBULA: Primary | ICD-10-CM

## 2024-11-15 DIAGNOSIS — S99.911A INJURY OF RIGHT ANKLE, INITIAL ENCOUNTER: ICD-10-CM
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
## 2024-12-09 ENCOUNTER — OFFICE VISIT (OUTPATIENT)
Dept: ORTHOPEDIC SURGERY | Age: 24
End: 2024-12-09
Payer: COMMERCIAL

## 2024-12-09 DIAGNOSIS — S82.839A AVULSION FRACTURE OF DISTAL FIBULA: Primary | ICD-10-CM

## 2024-12-09 DIAGNOSIS — M25.471 RIGHT ANKLE SWELLING: ICD-10-CM

## 2024-12-09 DIAGNOSIS — S99.911A INJURY OF RIGHT ANKLE, INITIAL ENCOUNTER: ICD-10-CM

## 2024-12-09 PROCEDURE — 99214 OFFICE O/P EST MOD 30 MIN: CPT | Performed by: ORTHOPAEDIC SURGERY

## 2024-12-09 NOTE — PROGRESS NOTES
for surgery  Follow up: After MRI scan  Work status: Sitting      History and discussion of management with: Her mother    This note was created using Dragon voice recognition software which may result in errors of speech and spelling recognition and word/phrase syntax errors.

## 2024-12-10 ENCOUNTER — TELEPHONE (OUTPATIENT)
Dept: ORTHOPEDIC SURGERY | Age: 24
End: 2024-12-10

## 2024-12-10 NOTE — TELEPHONE ENCOUNTER
MRI RIGHT ANKLE APPROVAL     Case   Case Description: Ankle MRI (right) Request ID:  Tracking: [id number]  [id number]         Request Date: 12/10/2024 01:15 PM Status: Approved   Entry Method: RadMD Validity Dates: 12/10/2024-1/9/2025    ICD10: M25.471, S82.839A, S99.911A  Contact Name: leif cabrales  (Referring Provider)   Initial Determination Date: 12/10/2024 01:27 PM Email: [email or web address]   Final Determination Date: 12/10/2024 01:27 PM       Please be advised that all data was current as of Tuesday, December 10, 2024 at 1:28 PM [facility]   Radiology   Date of Service: 1/9/2025   Update       Expedited: No   Extension: No   CPT4: 13017 Billable Codes   Clinical Rcvd: 12/10/2024 - Clinical information received via fax or upload

## 2024-12-25 ENCOUNTER — PATIENT MESSAGE (OUTPATIENT)
Dept: ORTHOPEDIC SURGERY | Age: 24
End: 2024-12-25

## 2024-12-31 ENCOUNTER — PATIENT MESSAGE (OUTPATIENT)
Dept: ORTHOPEDIC SURGERY | Age: 24
End: 2024-12-31

## 2025-01-07 ENCOUNTER — OFFICE VISIT (OUTPATIENT)
Dept: ORTHOPEDIC SURGERY | Age: 25
End: 2025-01-07
Payer: COMMERCIAL

## 2025-01-07 DIAGNOSIS — S99.911D INJURY OF RIGHT ANKLE, SUBSEQUENT ENCOUNTER: Primary | ICD-10-CM

## 2025-01-07 PROCEDURE — 99213 OFFICE O/P EST LOW 20 MIN: CPT | Performed by: ORTHOPAEDIC SURGERY
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
## 2025-01-13 ENCOUNTER — TELEPHONE (OUTPATIENT)
Dept: ORTHOPEDIC SURGERY | Age: 25
End: 2025-01-13

## 2025-01-13 NOTE — TELEPHONE ENCOUNTER
LM for pt to call the office back. Wanted to switch pt's MRI results apt to after her rescheduled MRI.

## 2025-03-05 ENCOUNTER — TELEPHONE (OUTPATIENT)
Dept: ORTHOPEDIC SURGERY | Age: 25
End: 2025-03-05

## 2025-03-05 DIAGNOSIS — S99.911D INJURY OF RIGHT ANKLE, SUBSEQUENT ENCOUNTER: Primary | ICD-10-CM

## 2025-03-05 NOTE — TELEPHONE ENCOUNTER
Is the  MRI  order in the  chart  still good to  use?    Pt  has now  sent  a message  asking  to  schedule  it

## 2025-03-13 ENCOUNTER — TELEPHONE (OUTPATIENT)
Dept: ORTHOPEDIC SURGERY | Age: 25
End: 2025-03-13

## 2025-03-13 NOTE — TELEPHONE ENCOUNTER
MRI RIGHT ANKLE APPROVAL:    Case   Case Description: Ankle MRI (right) Request ID:  Tracking: [id number]  [id number]         Request Date: 03/12/2025 12:17 PM Status: Approved   Entry Method: RadMD Validity Dates: 3/12/2025-4/11/2025    ICD10: S99.911D  Contact Name: leif cabrales  (Referring Provider)   Initial Determination Date: 03/12/2025 12:55 PM Email: [email or web address]   Final Determination Date: 03/12/2025 12:55 PM       Please be advised that all data was current as of Thursday, March 13, 2025 at 6:13 AM [facility]   Radiology   Date of Service: 4/3/2025   Update       Expedited: No   Extension: No   CPT4: 71124 Billable Codes   Clinical Rcvd: 3/12/2025 - Clinical information received via fax or upload
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
## 2025-03-28 ENCOUNTER — OFFICE VISIT (OUTPATIENT)
Dept: ORTHOPEDIC SURGERY | Age: 25
End: 2025-03-28
Payer: COMMERCIAL

## 2025-03-28 DIAGNOSIS — S82.839A AVULSION FRACTURE OF DISTAL FIBULA: ICD-10-CM

## 2025-03-28 DIAGNOSIS — S99.911D INJURY OF RIGHT ANKLE, SUBSEQUENT ENCOUNTER: Primary | ICD-10-CM

## 2025-03-28 DIAGNOSIS — M25.471 RIGHT ANKLE SWELLING: ICD-10-CM

## 2025-03-28 PROCEDURE — 99213 OFFICE O/P EST LOW 20 MIN: CPT | Performed by: ORTHOPAEDIC SURGERY

## 2025-04-01 ENCOUNTER — OFFICE VISIT (OUTPATIENT)
Dept: ORTHOPEDIC SURGERY | Age: 25
End: 2025-04-01
Payer: COMMERCIAL

## 2025-04-01 DIAGNOSIS — M25.471 RIGHT ANKLE SWELLING: ICD-10-CM

## 2025-04-01 DIAGNOSIS — S82.839A AVULSION FRACTURE OF DISTAL FIBULA: ICD-10-CM

## 2025-04-01 DIAGNOSIS — S99.911D INJURY OF RIGHT ANKLE, SUBSEQUENT ENCOUNTER: Primary | ICD-10-CM

## 2025-04-01 PROBLEM — S99.911A INJURY OF RIGHT ANKLE: Status: ACTIVE | Noted: 2025-04-01

## 2025-04-01 PROBLEM — S82.891A ANKLE FRACTURE, RIGHT: Status: ACTIVE | Noted: 2025-04-01

## 2025-04-01 PROCEDURE — 99214 OFFICE O/P EST MOD 30 MIN: CPT | Performed by: ORTHOPAEDIC SURGERY

## 2025-04-01 NOTE — PROGRESS NOTES
Name: Josh Lr  YOB: 2000  Gender: female  MRN: [id number]    Summary: Right chornic ankle sprain. With chronic instability    She has a distal fibular avulsion fracture nonunion that will need to be excised as well as an os trigonum that could be excised either arthroscopically or do an open approach.    Proceed with   Right  Ankle arthroscopy  Excision distal fibula non union  Lateral ankle Reconstruction with internal brace  Peroneal tendon evaluation  Os trigonum exicision    GENERAL w/ LOCAL - NO BLOCK     CC: right ankle pain    HPI: Josh Lr is a 25 y.o. female Presents today with right ankle pain.  She has a long history of childhood ankle sprains and then multiple injuries.  Her most recent injury was in November 2024.    History of Present Illness  The patient presents for evaluation of recurrent right ankle pain and instability. The patient has a history of a right ankle fracture during childhood, which necessitated immobilization in a boot. They report recurrent sprains and instability during physical activities. Additionally, they experience occasional posterior ankle pain, which is exacerbated by standing on toes or rotating the joint, described as a sensation of tightness and pulling. The boot provides inconsistent relief and sometimes exacerbates the pain. The current pain level is reported as 7-8/10. Approximately one month ago, the patient experienced an ankle inversion injury after running, resulting in knee pain that persisted for several hours. The patient plans to stay with their parent post-surgery due to the home layout and the presence of a ramp. They request a new boot as the current one is damaged and causes tripping. The patient desires an overnight hospital stay post-surgery for recovery.    Multiple radiographs have shown no fractures. An ultrasound ruled out the presence of blood clots. Magnetic resonance imaging (MRI) revealed significant scar tissue

## 2025-04-01 NOTE — H&P (VIEW-ONLY)
Name: Josh Lr  YOB: 2000  Gender: female  MRN: [id number]    Summary: Right chornic ankle sprain. With chronic instability    She has a distal fibular avulsion fracture nonunion that will need to be excised as well as an os trigonum that could be excised either arthroscopically or do an open approach.    Proceed with   Right  Ankle arthroscopy  Excision distal fibula non union  Lateral ankle Reconstruction with internal brace  Peroneal tendon evaluation  Os trigonum exicision    GENERAL w/ LOCAL - NO BLOCK     CC: right ankle pain    HPI: Josh Lr is a 25 y.o. female Presents today with right ankle pain.  She has a long history of childhood ankle sprains and then multiple injuries.  Her most recent injury was in November 2024.    History of Present Illness  The patient presents for evaluation of recurrent right ankle pain and instability. The patient has a history of a right ankle fracture during childhood, which necessitated immobilization in a boot. They report recurrent sprains and instability during physical activities. Additionally, they experience occasional posterior ankle pain, which is exacerbated by standing on toes or rotating the joint, described as a sensation of tightness and pulling. The boot provides inconsistent relief and sometimes exacerbates the pain. The current pain level is reported as 7-8/10. Approximately one month ago, the patient experienced an ankle inversion injury after running, resulting in knee pain that persisted for several hours. The patient plans to stay with their parent post-surgery due to the home layout and the presence of a ramp. They request a new boot as the current one is damaged and causes tripping. The patient desires an overnight hospital stay post-surgery for recovery.    Multiple radiographs have shown no fractures. An ultrasound ruled out the presence of blood clots. Magnetic resonance imaging (MRI) revealed significant scar tissue  peritenonitis.    Electronically signed by Dayana Edmonds MD    I independently reviewed the MRI and I agree that the os trigonum does show signs of edema indicative of posterior ankle impingement along with damage to the anterior lateral and calcaneal fibular ligaments with a nonunion of the distal fibula.      DifferentiaL Diagnosis:     Lateral Ankle Sprain/Injury  Chronic ankle instability  Posterior ankle impingement     Treatment Plan:   Assessment & Plan  1. Right ankle pain: Chronic. Imaging reveals chronic damage, torn ligaments, and bone spur (os trigonum). Chronic instability causes joint damage and abnormal movement.  - Recommend surgical intervention to stabilize ankle and alleviate pain  - Procedure involves ankle scope to clean inside, repair ligaments, and remove broken bone piece  - Postoperative care: splint, boot at three weeks, range of motion exercises, weight-bearing at four weeks, walking at five weeks, physical therapy at six weeks  - Use crutches and knee scooter  - Provide new boot at three-week postoperative visit  - Surgery will improve stability and reduce pain, but residual pain and swelling may persist.  In fact I was bluntly honest with her and explained that some of the damage that she has in his ankle I cannot fix.  That I can improve the stability with surgery but pain is something I am not as confident in helping her with.  I explained that if she chooses to have surgery the main goal is stability in the prevention of future degradation of the ankle joint but I cannot promise the surgery will relieve her pain.  In fact I do think she will continue to hurt after surgery.  I do think that she will improve in the pain but I do think she will continue to have pain.    Follow-up:  I had a thoroughly informed the patient of the plan for treatment.    I discussed non operative treatment initially.  We discussed Ankle: ASO brace, [place] Brace, Injections, oral NSAIDS, activity

## 2025-04-05 ENCOUNTER — HOSPITAL ENCOUNTER (EMERGENCY)
Age: 25
Discharge: HOME | End: 2025-04-05
Payer: COMMERCIAL

## 2025-04-05 VITALS
TEMPERATURE: 98.4 F | DIASTOLIC BLOOD PRESSURE: 76 MMHG | RESPIRATION RATE: 16 BRPM | HEART RATE: 81 BPM | BODY MASS INDEX: 33.99 KG/M2 | HEIGHT: 61 IN | SYSTOLIC BLOOD PRESSURE: 94 MMHG | OXYGEN SATURATION: 99 % | WEIGHT: 180 LBS

## 2025-04-05 DIAGNOSIS — N39.0 ACUTE UTI: ICD-10-CM

## 2025-04-05 DIAGNOSIS — R11.2 NAUSEA AND VOMITING, UNSPECIFIED VOMITING TYPE: Primary | ICD-10-CM

## 2025-04-05 LAB
ALBUMIN SERPL-MCNC: 4.3 G/DL (ref 3.5–5)
ALBUMIN/GLOB SERPL: 1.1 (ref 1–1.9)
ALP SERPL-CCNC: 148 U/L (ref 35–104)
ALT SERPL-CCNC: 14 U/L (ref 8–45)
ANION GAP SERPL CALC-SCNC: 15 MMOL/L (ref 7–16)
AST SERPL-CCNC: 18 U/L (ref 15–37)
BACTERIA URNS QL MICRO: ABNORMAL /HPF
BASOPHILS # BLD: 0.03 K/UL (ref 0–0.2)
BASOPHILS NFR BLD: 0.3 % (ref 0–2)
BILIRUB SERPL-MCNC: 0.5 MG/DL (ref 0–1.2)
BILIRUB UR QL: ABNORMAL
BUN SERPL-MCNC: 13 MG/DL (ref 6–23)
CALCIUM SERPL-MCNC: 9.7 MG/DL (ref 8.8–10.2)
CASTS URNS QL MICRO: 0 /LPF
CHLORIDE SERPL-SCNC: 100 MMOL/L (ref 98–107)
CO2 SERPL-SCNC: 27 MMOL/L (ref 20–29)
CREAT SERPL-MCNC: 0.67 MG/DL (ref 0.6–1.1)
CRYSTALS URNS QL MICRO: 0 /LPF
DIFFERENTIAL METHOD BLD: ABNORMAL
EOSINOPHIL # BLD: 0.02 K/UL (ref 0–0.8)
EOSINOPHIL NFR BLD: 0.2 % (ref 0.5–7.8)
EPI CELLS #/AREA URNS HPF: ABNORMAL /HPF
ERYTHROCYTE [DISTWIDTH] IN BLOOD BY AUTOMATED COUNT: 13.2 % (ref 11.9–14.6)
GLOBULIN SER CALC-MCNC: 3.8 G/DL (ref 2.3–3.5)
GLUCOSE SERPL-MCNC: 101 MG/DL (ref 70–99)
GLUCOSE UR QL STRIP.AUTO: NEGATIVE MG/DL
HCG SERPL QL: NEGATIVE
HCG UR QL: NEGATIVE
HCT VFR BLD AUTO: 39.9 % (ref 35.8–46.3)
HGB BLD-MCNC: 13.4 G/DL (ref 11.7–15.4)
IMM GRANULOCYTES # BLD AUTO: 0.03 K/UL (ref 0–0.5)
IMM GRANULOCYTES NFR BLD AUTO: 0.3 % (ref 0–5)
KETONES UR-MCNC: 40 MG/DL
LEUKOCYTE ESTERASE UR QL STRIP: ABNORMAL
LIPASE SERPL-CCNC: 17 U/L (ref 13–60)
LYMPHOCYTES # BLD: 1.92 K/UL (ref 0.5–4.6)
LYMPHOCYTES NFR BLD: 19.9 % (ref 13–44)
MAGNESIUM SERPL-MCNC: 1.9 MG/DL (ref 1.8–2.4)
MCH RBC QN AUTO: 26.9 PG (ref 26.1–32.9)
MCHC RBC AUTO-ENTMCNC: 33.6 G/DL (ref 31.4–35)
MCV RBC AUTO: 80 FL (ref 82–102)
MONOCYTES # BLD: 0.59 K/UL (ref 0.1–1.3)
MONOCYTES NFR BLD: 6.1 % (ref 4–12)
MUCOUS THREADS URNS QL MICRO: ABNORMAL /LPF
NEUTS SEG # BLD: 7.07 K/UL (ref 1.7–8.2)
NEUTS SEG NFR BLD: 73.2 % (ref 43–78)
NITRITE UR QL: NEGATIVE
NRBC # BLD: 0 K/UL (ref 0–0.2)
OTHER OBSERVATIONS: ABNORMAL
PH UR: 7.5 (ref 5–9)
PLATELET # BLD AUTO: 400 K/UL (ref 150–450)
PMV BLD AUTO: 9.8 FL (ref 9.4–12.3)
POTASSIUM SERPL-SCNC: 3.9 MMOL/L (ref 3.5–5.1)
PROT SERPL-MCNC: 8.1 G/DL (ref 6.3–8.2)
PROT UR QL: >300 MG/DL
RBC # BLD AUTO: 4.99 M/UL (ref 4.05–5.2)
RBC # UR STRIP: NEGATIVE
RBC #/AREA URNS HPF: ABNORMAL /HPF
SERVICE CMNT-IMP: ABNORMAL
SODIUM SERPL-SCNC: 142 MMOL/L (ref 136–145)
SP GR UR: 1.02 (ref 1–1.02)
UROBILINOGEN UR QL: 0.2 EU/DL (ref 0.2–1)
WBC # BLD AUTO: 9.7 K/UL (ref 4.3–11.1)
WBC URNS QL MICRO: ABNORMAL /HPF

## 2025-04-05 PROCEDURE — 81001 URINALYSIS AUTO W/SCOPE: CPT

## 2025-04-05 PROCEDURE — 96361 HYDRATE IV INFUSION ADD-ON: CPT

## 2025-04-05 PROCEDURE — 83690 ASSAY OF LIPASE: CPT

## 2025-04-05 PROCEDURE — 85025 COMPLETE CBC W/AUTO DIFF WBC: CPT

## 2025-04-05 PROCEDURE — 2580000003 HC RX 258: Performed by: NURSE PRACTITIONER

## 2025-04-05 PROCEDURE — 87086 URINE CULTURE/COLONY COUNT: CPT

## 2025-04-05 PROCEDURE — 99284 EMERGENCY DEPT VISIT MOD MDM: CPT

## 2025-04-05 PROCEDURE — 96374 THER/PROPH/DIAG INJ IV PUSH: CPT

## 2025-04-05 PROCEDURE — 81025 URINE PREGNANCY TEST: CPT

## 2025-04-05 PROCEDURE — 6370000000 HC RX 637 (ALT 250 FOR IP): Performed by: NURSE PRACTITIONER

## 2025-04-05 PROCEDURE — 84703 CHORIONIC GONADOTROPIN ASSAY: CPT

## 2025-04-05 PROCEDURE — 6360000002 HC RX W HCPCS: Performed by: NURSE PRACTITIONER

## 2025-04-05 PROCEDURE — 83735 ASSAY OF MAGNESIUM: CPT

## 2025-04-05 PROCEDURE — 80053 COMPREHEN METABOLIC PANEL: CPT

## 2025-04-05 RX ORDER — NITROFURANTOIN 25; 75 MG/1; MG/1
100 CAPSULE ORAL 2 TIMES DAILY
Qty: 10 CAPSULE | Refills: 0 | Status: SHIPPED | OUTPATIENT
Start: 2025-04-05 | End: 2025-04-10

## 2025-04-05 RX ORDER — ONDANSETRON 2 MG/ML
4 INJECTION INTRAMUSCULAR; INTRAVENOUS
Status: COMPLETED | OUTPATIENT
Start: 2025-04-05 | End: 2025-04-05

## 2025-04-05 RX ORDER — 0.9 % SODIUM CHLORIDE 0.9 %
1000 INTRAVENOUS SOLUTION INTRAVENOUS
Status: COMPLETED | OUTPATIENT
Start: 2025-04-05 | End: 2025-04-05

## 2025-04-05 RX ORDER — NITROFURANTOIN 25; 75 MG/1; MG/1
100 CAPSULE ORAL 2 TIMES DAILY
Qty: 10 CAPSULE | Refills: 0 | Status: SHIPPED | OUTPATIENT
Start: 2025-04-05 | End: 2025-04-05

## 2025-04-05 RX ORDER — PROMETHAZINE HYDROCHLORIDE 25 MG/1
25 TABLET ORAL EVERY 6 HOURS PRN
Qty: 20 TABLET | Refills: 0 | Status: SHIPPED | OUTPATIENT
Start: 2025-04-05 | End: 2025-04-12

## 2025-04-05 RX ORDER — ONDANSETRON 4 MG/1
4 TABLET, ORALLY DISINTEGRATING ORAL 3 TIMES DAILY PRN
Qty: 21 TABLET | Refills: 0 | Status: SHIPPED | OUTPATIENT
Start: 2025-04-05

## 2025-04-05 RX ORDER — NITROFURANTOIN 25; 75 MG/1; MG/1
100 CAPSULE ORAL
Status: COMPLETED | OUTPATIENT
Start: 2025-04-05 | End: 2025-04-05

## 2025-04-05 RX ORDER — PROMETHAZINE HYDROCHLORIDE 25 MG/1
25 TABLET ORAL ONCE
Status: COMPLETED | OUTPATIENT
Start: 2025-04-05 | End: 2025-04-05

## 2025-04-05 RX ORDER — ONDANSETRON 4 MG/1
4 TABLET, ORALLY DISINTEGRATING ORAL 3 TIMES DAILY PRN
Qty: 21 TABLET | Refills: 0 | Status: SHIPPED | OUTPATIENT
Start: 2025-04-05 | End: 2025-04-05

## 2025-04-05 RX ORDER — PROMETHAZINE HYDROCHLORIDE 25 MG/1
25 TABLET ORAL EVERY 6 HOURS PRN
Qty: 20 TABLET | Refills: 0 | Status: SHIPPED | OUTPATIENT
Start: 2025-04-05 | End: 2025-04-05

## 2025-04-05 RX ADMIN — ONDANSETRON 4 MG: 2 INJECTION, SOLUTION INTRAMUSCULAR; INTRAVENOUS at 21:17

## 2025-04-05 RX ADMIN — PROMETHAZINE HYDROCHLORIDE 25 MG: 25 TABLET ORAL at 22:36

## 2025-04-05 RX ADMIN — NITROFURANTOIN MONOHYDRATE/MACROCRYSTALS 100 MG: 75; 25 CAPSULE ORAL at 22:36

## 2025-04-05 RX ADMIN — SODIUM CHLORIDE 1000 ML: 0.9 INJECTION, SOLUTION INTRAVENOUS at 21:16

## 2025-04-05 ASSESSMENT — PAIN - FUNCTIONAL ASSESSMENT
PAIN_FUNCTIONAL_ASSESSMENT: 0-10
PAIN_FUNCTIONAL_ASSESSMENT: 0-10

## 2025-04-05 ASSESSMENT — ENCOUNTER SYMPTOMS
DIARRHEA: 0
SHORTNESS OF BREATH: 0
VOMITING: 1
NAUSEA: 1
ABDOMINAL PAIN: 0
CONSTIPATION: 0

## 2025-04-05 ASSESSMENT — LIFESTYLE VARIABLES
HOW OFTEN DO YOU HAVE A DRINK CONTAINING ALCOHOL: 2-3 TIMES A WEEK
HOW MANY STANDARD DRINKS CONTAINING ALCOHOL DO YOU HAVE ON A TYPICAL DAY: 1 OR 2

## 2025-04-05 ASSESSMENT — PAIN DESCRIPTION - LOCATION: LOCATION: HEAD

## 2025-04-05 ASSESSMENT — PAIN SCALES - GENERAL
PAINLEVEL_OUTOF10: 6
PAINLEVEL_OUTOF10: 3

## 2025-04-06 LAB
BACTERIA SPEC CULT: NORMAL
SERVICE CMNT-IMP: NORMAL

## 2025-04-06 NOTE — DISCHARGE INSTRUCTIONS
Take medication as prescribed. Return to the ER for any new, worsening, or concerning symptoms. Follow-up with your primary care provider for a recheck of your symptoms.

## 2025-04-06 NOTE — ED TRIAGE NOTES
C/o nausea and vomiting with HA for 24 hours, chills when she throws up, denies abd pain. States she had a lot to drink yesterday and doesn't typically drink. Took zofran at home with no relief. Mirena in for 18 months and removed two weeks ago. States has had unprotected sex twice since then.   
Patient requests all Lab, Cardiology, and Radiology Results on their Discharge Instructions

## 2025-04-06 NOTE — ED NOTES
Patient mobility status  with no difficulty.     I have reviewed discharge instructions with the patient.  The patient verbalized understanding.    Patient left ED via Discharge Method: ambulatory to Home with Significant Other.    Opportunity for questions and clarification provided.     Patient given 3 scripts.           Susan Fuentes RN  04/05/25 5850

## 2025-04-06 NOTE — ED PROVIDER NOTES
Emergency Department Provider Note       PCP: No primary care provider on file.   Age: 25 y.o.   Sex: female     DISPOSITION Decision To Discharge 04/05/2025 10:39:56 PM    ICD-10-CM    1. Nausea and vomiting, unspecified vomiting type  R11.2       2. Acute UTI  N39.0           Medical Decision Making     25-year-old female presents to the emergency department today with complaints of nausea and vomiting.  She appears in no acute distress.  She is afebrile.  No tachycardia.  No abdominal pain.  No indication for imaging.  Will check labs and UA.  Will give IV fluids and antiemetic.    Labs unremarkable.  No leukocytosis.  No electrolyte derangement.  Patient feeling better after treatment provided.  Urine concerning for possible UTI.  Will send for culture.  Patient would like to go ahead and be treated as she states that she usually cannot tell when she has a urinary tract infection.  Will start on Macrobid.  Appears stable and appropriate for discharge.  ER return precautions discussed.     1 acute, uncomplicated illness or injury.  Prescription drug management performed.  Shared medical decision making was utilized in creating the patients health plan today.  I independently ordered and reviewed each unique test.    I reviewed external records: provider visit note from PCP.                     History     25-year-old female presents to the emergency department today with complaints of nausea and vomiting since last night.  She denies any abdominal pain.  She states her throat is sore from the vomiting but otherwise she has no pain.  She denies any fever, chills, shortness of breath, or diarrhea.  She tried Zofran at home without relief.  She states she was drinking alcohol last night.  She believes that her symptoms are likely due to the alcohol but her boyfriend is concerned she may be pregnant.  She states that she did have her Mirena removed 2 weeks ago and has had unprotected intercourse.    The history is

## 2025-04-08 PROBLEM — S82.891A ANKLE FRACTURE, RIGHT: Status: ACTIVE | Noted: 2025-04-01

## 2025-04-08 LAB
BACTERIA SPEC CULT: NORMAL
SERVICE CMNT-IMP: NORMAL

## 2025-04-08 RX ORDER — METFORMIN HYDROCHLORIDE 500 MG/1
500 TABLET, EXTENDED RELEASE ORAL NIGHTLY
COMMUNITY
Start: 2024-10-29 | End: 2025-10-29

## 2025-04-08 RX ORDER — DEXTROAMPHETAMINE SACCHARATE, AMPHETAMINE ASPARTATE, DEXTROAMPHETAMINE SULFATE AND AMPHETAMINE SULFATE 3.75; 3.75; 3.75; 3.75 MG/1; MG/1; MG/1; MG/1
15 TABLET ORAL 2 TIMES DAILY
COMMUNITY
Start: 2025-01-16

## 2025-04-08 RX ORDER — CYCLOBENZAPRINE HCL 10 MG
TABLET ORAL PRN
COMMUNITY
Start: 2024-10-29

## 2025-04-08 NOTE — PERIOP NOTE
Patient verified name and .  Order for consent NOT found in EHR at time of PAT visit. Unable to verify case posting against order; surgery verified by patient.    Type 1B surgery, phone assessment complete.  Orders not received.  Labs per surgeon: none ordered  Labs per anesthesia protocol: SQBS s/h for DOS    Patient answered medical/surgical history questions at their best of ability. All prior to admission medications documented in EPIC.    Patient instructed to continue taking all prescription medications up to the day of surgery but to take only the following medications the day of surgery according to anesthesia guidelines with a small sip of water: Adderall if needed, zofran if needed.   Also, patient is requested to take 2 Tylenol in the morning and then again before bed on the day before surgery. Regular or extra strength may be used.       Patient informed that all vitamins and supplements should be held 7 days prior to surgery and NSAIDS 5 days prior to surgery. Prescription meds to hold:none    Patient instructed on the following:    > Arrive at [facility] Entrance, time of arrival to be called the day before by 1700  > No food after midnight, patient may drink clear liquids up until 2 hours prior to arrival. No gum, candy, mints.   > Responsible adult must drive patient to the hospital, stay during surgery, and patient will need supervision 24 hours after anesthesia  > Use non moisturizing soap in shower the night before surgery and on the morning of surgery  > All piercings must be removed prior to arrival.    > Leave all valuables (money and jewelry) at home but bring insurance card and ID on DOS.   > You may be required to pay a deductible or co-pay on the day of your procedure. You can pre-pay by calling [phone] if your surgery is at the [facility] or [phone] if your surgery is at the [facility].  > Do not wear make-up, nail polish, lotions, cologne, perfumes, powders, or oil on skin.

## 2025-04-15 DIAGNOSIS — G89.18 POST-OP PAIN: Primary | ICD-10-CM

## 2025-04-15 RX ORDER — ASPIRIN 81 MG/1
81 TABLET ORAL 2 TIMES DAILY
Qty: 42 TABLET | Refills: 0 | Status: SHIPPED | OUTPATIENT
Start: 2025-04-15 | End: 2025-05-06

## 2025-04-15 RX ORDER — OXYCODONE HYDROCHLORIDE 5 MG/1
5 TABLET ORAL EVERY 6 HOURS PRN
Qty: 20 TABLET | Refills: 0 | Status: SHIPPED | OUTPATIENT
Start: 2025-04-15 | End: 2025-04-20

## 2025-04-15 RX ORDER — ONDANSETRON 4 MG/1
4 TABLET, FILM COATED ORAL DAILY PRN
Qty: 30 TABLET | Refills: 0 | Status: SHIPPED | OUTPATIENT
Start: 2025-04-15

## 2025-04-15 RX ORDER — DOCUSATE SODIUM 100 MG/1
100 CAPSULE, LIQUID FILLED ORAL DAILY PRN
Qty: 30 CAPSULE | Refills: 0 | Status: SHIPPED | OUTPATIENT
Start: 2025-04-15

## 2025-04-15 NOTE — PERIOP NOTE
Preop department called to notify patient of arrival time for scheduled procedure. Instructions given to   - Arrive at [facility] Entrance [address].  - Nothing to eat after midnight unless otherwise indicated. No gum, mints, or ice chips. You may have clear liquids two hours prior to arrival to the hospital.   - Have a responsible adult to drive patient to the hospital, stay during surgery, and patient will need supervision 24 hours after anesthesia.   - Use antibacterial soap in shower the night before surgery and on the morning of surgery.       Was patient contacted: yes-pt   Voicemail left:   Numbers contacted: [phone]   Arrival time: 0630    Time to stop clear liquids: 0430

## 2025-04-16 ENCOUNTER — HOSPITAL ENCOUNTER (OUTPATIENT)
Age: 25
Setting detail: OUTPATIENT SURGERY
Discharge: HOME | End: 2025-04-16
Attending: ORTHOPAEDIC SURGERY | Admitting: ORTHOPAEDIC SURGERY
Payer: COMMERCIAL

## 2025-04-16 ENCOUNTER — ANESTHESIA EVENT (OUTPATIENT)
Dept: SURGERY | Age: 25
End: 2025-04-16
Payer: COMMERCIAL

## 2025-04-16 ENCOUNTER — ANESTHESIA (OUTPATIENT)
Dept: SURGERY | Age: 25
End: 2025-04-16
Payer: COMMERCIAL

## 2025-04-16 ENCOUNTER — APPOINTMENT (OUTPATIENT)
Dept: GENERAL RADIOLOGY | Age: 25
End: 2025-04-16
Attending: ORTHOPAEDIC SURGERY
Payer: COMMERCIAL

## 2025-04-16 VITALS
DIASTOLIC BLOOD PRESSURE: 56 MMHG | TEMPERATURE: 97.5 F | WEIGHT: 188 LBS | RESPIRATION RATE: 18 BRPM | BODY MASS INDEX: 35.5 KG/M2 | OXYGEN SATURATION: 93 % | HEART RATE: 101 BPM | SYSTOLIC BLOOD PRESSURE: 111 MMHG | HEIGHT: 61 IN

## 2025-04-16 LAB
GLUCOSE BLD STRIP.AUTO-MCNC: 120 MG/DL (ref 65–100)
SERVICE CMNT-IMP: ABNORMAL

## 2025-04-16 PROCEDURE — 2580000003 HC RX 258: Performed by: ANESTHESIOLOGY

## 2025-04-16 PROCEDURE — 7100000001 HC PACU RECOVERY - ADDTL 15 MIN: Performed by: ORTHOPAEDIC SURGERY

## 2025-04-16 PROCEDURE — 29898 ANKLE ARTHROSCOPY/SURGERY: CPT | Performed by: ORTHOPAEDIC SURGERY

## 2025-04-16 PROCEDURE — 3700000001 HC ADD 15 MINUTES (ANESTHESIA): Performed by: ORTHOPAEDIC SURGERY

## 2025-04-16 PROCEDURE — 6360000002 HC RX W HCPCS: Performed by: ANESTHESIOLOGY

## 2025-04-16 PROCEDURE — 82962 GLUCOSE BLOOD TEST: CPT

## 2025-04-16 PROCEDURE — 7100000000 HC PACU RECOVERY - FIRST 15 MIN: Performed by: ORTHOPAEDIC SURGERY

## 2025-04-16 PROCEDURE — 28120 PART REMOVAL OF ANKLE/HEEL: CPT | Performed by: ORTHOPAEDIC SURGERY

## 2025-04-16 PROCEDURE — 6370000000 HC RX 637 (ALT 250 FOR IP): Performed by: ANESTHESIOLOGY

## 2025-04-16 PROCEDURE — 3600000004 HC SURGERY LEVEL 4 BASE: Performed by: ORTHOPAEDIC SURGERY

## 2025-04-16 PROCEDURE — 2709999900 HC NON-CHARGEABLE SUPPLY: Performed by: ORTHOPAEDIC SURGERY

## 2025-04-16 PROCEDURE — 27641 PARTIAL REMOVAL OF FIBULA: CPT | Performed by: ORTHOPAEDIC SURGERY

## 2025-04-16 PROCEDURE — 76942 ECHO GUIDE FOR BIOPSY: CPT | Performed by: ANESTHESIOLOGY

## 2025-04-16 PROCEDURE — 2500000003 HC RX 250 WO HCPCS: Performed by: NURSE ANESTHETIST, CERTIFIED REGISTERED

## 2025-04-16 PROCEDURE — 6360000002 HC RX W HCPCS: Performed by: PHYSICIAN ASSISTANT

## 2025-04-16 PROCEDURE — 27680 RELEASE OF LOWER LEG TENDON: CPT | Performed by: ORTHOPAEDIC SURGERY

## 2025-04-16 PROCEDURE — 3700000000 HC ANESTHESIA ATTENDED CARE: Performed by: ORTHOPAEDIC SURGERY

## 2025-04-16 PROCEDURE — 7100000010 HC PHASE II RECOVERY - FIRST 15 MIN: Performed by: ORTHOPAEDIC SURGERY

## 2025-04-16 PROCEDURE — 6360000002 HC RX W HCPCS: Performed by: NURSE ANESTHETIST, CERTIFIED REGISTERED

## 2025-04-16 PROCEDURE — 27698 REPAIR OF ANKLE LIGAMENT: CPT | Performed by: ORTHOPAEDIC SURGERY

## 2025-04-16 PROCEDURE — C1713 ANCHOR/SCREW BN/BN,TIS/BN: HCPCS | Performed by: ORTHOPAEDIC SURGERY

## 2025-04-16 PROCEDURE — 3600000014 HC SURGERY LEVEL 4 ADDTL 15MIN: Performed by: ORTHOPAEDIC SURGERY

## 2025-04-16 PROCEDURE — 6360000002 HC RX W HCPCS: Performed by: ORTHOPAEDIC SURGERY

## 2025-04-16 DEVICE — ANCHOR SFT TISS BICEPS FIBERTAK: Type: IMPLANTABLE DEVICE | Site: ANKLE | Status: FUNCTIONAL

## 2025-04-16 DEVICE — IB KIT, BC, W/ CC FT AND JUMPSTART
Type: IMPLANTABLE DEVICE | Site: ANKLE | Status: FUNCTIONAL
Brand: ARTHREX®

## 2025-04-16 DEVICE — ANCHOR SUT BICEPS IMPL FIBERTAK: Type: IMPLANTABLE DEVICE | Site: ANKLE | Status: FUNCTIONAL

## 2025-04-16 RX ORDER — OXYCODONE HYDROCHLORIDE 5 MG/1
5 TABLET ORAL PRN
Status: COMPLETED | OUTPATIENT
Start: 2025-04-16 | End: 2025-04-16

## 2025-04-16 RX ORDER — MIDAZOLAM HYDROCHLORIDE 2 MG/2ML
2 INJECTION, SOLUTION INTRAMUSCULAR; INTRAVENOUS
Status: COMPLETED | OUTPATIENT
Start: 2025-04-16 | End: 2025-04-16

## 2025-04-16 RX ORDER — SODIUM CHLORIDE 0.9 % (FLUSH) 0.9 %
5-40 SYRINGE (ML) INJECTION PRN
Status: DISCONTINUED | OUTPATIENT
Start: 2025-04-16 | End: 2025-04-16 | Stop reason: HOSPADM

## 2025-04-16 RX ORDER — PROCHLORPERAZINE EDISYLATE 5 MG/ML
5 INJECTION INTRAMUSCULAR; INTRAVENOUS
Status: DISCONTINUED | OUTPATIENT
Start: 2025-04-16 | End: 2025-04-16 | Stop reason: HOSPADM

## 2025-04-16 RX ORDER — ONDANSETRON 2 MG/ML
INJECTION INTRAMUSCULAR; INTRAVENOUS
Status: DISCONTINUED | OUTPATIENT
Start: 2025-04-16 | End: 2025-04-16 | Stop reason: SDUPTHER

## 2025-04-16 RX ORDER — LIDOCAINE HYDROCHLORIDE 10 MG/ML
1 INJECTION, SOLUTION INFILTRATION; PERINEURAL
Status: DISCONTINUED | OUTPATIENT
Start: 2025-04-16 | End: 2025-04-16 | Stop reason: HOSPADM

## 2025-04-16 RX ORDER — KETOROLAC TROMETHAMINE 30 MG/ML
30 INJECTION, SOLUTION INTRAMUSCULAR; INTRAVENOUS
Status: COMPLETED | OUTPATIENT
Start: 2025-04-16 | End: 2025-04-16

## 2025-04-16 RX ORDER — OXYCODONE HYDROCHLORIDE 5 MG/1
10 TABLET ORAL PRN
Status: COMPLETED | OUTPATIENT
Start: 2025-04-16 | End: 2025-04-16

## 2025-04-16 RX ORDER — SODIUM CHLORIDE 9 MG/ML
INJECTION, SOLUTION INTRAVENOUS PRN
Status: DISCONTINUED | OUTPATIENT
Start: 2025-04-16 | End: 2025-04-16 | Stop reason: HOSPADM

## 2025-04-16 RX ORDER — SODIUM CHLORIDE, SODIUM LACTATE, POTASSIUM CHLORIDE, CALCIUM CHLORIDE 600; 310; 30; 20 MG/100ML; MG/100ML; MG/100ML; MG/100ML
INJECTION, SOLUTION INTRAVENOUS CONTINUOUS
Status: DISCONTINUED | OUTPATIENT
Start: 2025-04-16 | End: 2025-04-16 | Stop reason: HOSPADM

## 2025-04-16 RX ORDER — SODIUM CHLORIDE 0.9 % (FLUSH) 0.9 %
5-40 SYRINGE (ML) INJECTION EVERY 12 HOURS SCHEDULED
Status: DISCONTINUED | OUTPATIENT
Start: 2025-04-16 | End: 2025-04-16 | Stop reason: HOSPADM

## 2025-04-16 RX ORDER — FENTANYL CITRATE 50 UG/ML
100 INJECTION, SOLUTION INTRAMUSCULAR; INTRAVENOUS ONCE
Status: COMPLETED | OUTPATIENT
Start: 2025-04-16 | End: 2025-04-16

## 2025-04-16 RX ORDER — NALOXONE HYDROCHLORIDE 0.4 MG/ML
INJECTION, SOLUTION INTRAMUSCULAR; INTRAVENOUS; SUBCUTANEOUS PRN
Status: DISCONTINUED | OUTPATIENT
Start: 2025-04-16 | End: 2025-04-16 | Stop reason: HOSPADM

## 2025-04-16 RX ORDER — LIDOCAINE HYDROCHLORIDE 20 MG/ML
INJECTION, SOLUTION EPIDURAL; INFILTRATION; INTRACAUDAL; PERINEURAL
Status: DISCONTINUED | OUTPATIENT
Start: 2025-04-16 | End: 2025-04-16 | Stop reason: SDUPTHER

## 2025-04-16 RX ORDER — DEXMEDETOMIDINE HYDROCHLORIDE 100 UG/ML
INJECTION, SOLUTION INTRAVENOUS
Status: DISCONTINUED | OUTPATIENT
Start: 2025-04-16 | End: 2025-04-16 | Stop reason: SDUPTHER

## 2025-04-16 RX ORDER — ACETAMINOPHEN 500 MG
1000 TABLET ORAL ONCE
Status: COMPLETED | OUTPATIENT
Start: 2025-04-16 | End: 2025-04-16

## 2025-04-16 RX ORDER — ONDANSETRON 2 MG/ML
4 INJECTION INTRAMUSCULAR; INTRAVENOUS
Status: DISCONTINUED | OUTPATIENT
Start: 2025-04-16 | End: 2025-04-16 | Stop reason: HOSPADM

## 2025-04-16 RX ORDER — DEXAMETHASONE SODIUM PHOSPHATE 4 MG/ML
INJECTION, SOLUTION INTRA-ARTICULAR; INTRALESIONAL; INTRAMUSCULAR; INTRAVENOUS; SOFT TISSUE
Status: DISCONTINUED | OUTPATIENT
Start: 2025-04-16 | End: 2025-04-16 | Stop reason: SDUPTHER

## 2025-04-16 RX ORDER — PROPOFOL 10 MG/ML
INJECTION, EMULSION INTRAVENOUS
Status: DISCONTINUED | OUTPATIENT
Start: 2025-04-16 | End: 2025-04-16 | Stop reason: SDUPTHER

## 2025-04-16 RX ORDER — DIPHENHYDRAMINE HYDROCHLORIDE 50 MG/ML
12.5 INJECTION, SOLUTION INTRAMUSCULAR; INTRAVENOUS
Status: DISCONTINUED | OUTPATIENT
Start: 2025-04-16 | End: 2025-04-16 | Stop reason: HOSPADM

## 2025-04-16 RX ADMIN — DEXAMETHASONE SODIUM PHOSPHATE 4 MG: 4 INJECTION INTRA-ARTICULAR; INTRALESIONAL; INTRAMUSCULAR; INTRAVENOUS; SOFT TISSUE at 08:38

## 2025-04-16 RX ADMIN — DEXMEDETOMIDINE 8 MCG: 100 INJECTION, SOLUTION, CONCENTRATE INTRAVENOUS at 09:14

## 2025-04-16 RX ADMIN — EPHEDRINE SULFATE 5 MG: 5 INJECTION INTRAVENOUS at 08:54

## 2025-04-16 RX ADMIN — LIDOCAINE HYDROCHLORIDE 60 MG: 20 INJECTION, SOLUTION EPIDURAL; INFILTRATION; INTRACAUDAL; PERINEURAL at 08:28

## 2025-04-16 RX ADMIN — PHENYLEPHRINE HYDROCHLORIDE 100 MCG: 0.1 INJECTION, SOLUTION INTRAVENOUS at 09:41

## 2025-04-16 RX ADMIN — FENTANYL CITRATE 100 MCG: 50 INJECTION INTRAMUSCULAR; INTRAVENOUS at 07:41

## 2025-04-16 RX ADMIN — EPHEDRINE SULFATE 5 MG: 5 INJECTION INTRAVENOUS at 08:59

## 2025-04-16 RX ADMIN — SODIUM CHLORIDE, SODIUM LACTATE, POTASSIUM CHLORIDE, AND CALCIUM CHLORIDE: 600; 310; 30; 20 INJECTION, SOLUTION INTRAVENOUS at 06:46

## 2025-04-16 RX ADMIN — OXYCODONE 10 MG: 5 TABLET ORAL at 10:47

## 2025-04-16 RX ADMIN — EPHEDRINE SULFATE 5 MG: 5 INJECTION INTRAVENOUS at 08:45

## 2025-04-16 RX ADMIN — HYDROMORPHONE HYDROCHLORIDE 0.5 MG: 1 INJECTION, SOLUTION INTRAMUSCULAR; INTRAVENOUS; SUBCUTANEOUS at 10:33

## 2025-04-16 RX ADMIN — MIDAZOLAM HYDROCHLORIDE 2 MG: 1 INJECTION, SOLUTION INTRAMUSCULAR; INTRAVENOUS at 07:41

## 2025-04-16 RX ADMIN — PROPOFOL 200 MG: 10 INJECTION, EMULSION INTRAVENOUS at 08:28

## 2025-04-16 RX ADMIN — KETOROLAC TROMETHAMINE 30 MG: 30 INJECTION, SOLUTION INTRAMUSCULAR at 10:50

## 2025-04-16 RX ADMIN — ONDANSETRON 4 MG: 2 INJECTION, SOLUTION INTRAMUSCULAR; INTRAVENOUS at 08:38

## 2025-04-16 RX ADMIN — Medication 2000 MG: at 08:38

## 2025-04-16 RX ADMIN — HYDROMORPHONE HYDROCHLORIDE 0.5 MG: 1 INJECTION, SOLUTION INTRAMUSCULAR; INTRAVENOUS; SUBCUTANEOUS at 10:28

## 2025-04-16 RX ADMIN — SODIUM CHLORIDE, SODIUM LACTATE, POTASSIUM CHLORIDE, AND CALCIUM CHLORIDE: 600; 310; 30; 20 INJECTION, SOLUTION INTRAVENOUS at 09:42

## 2025-04-16 RX ADMIN — Medication 20 ML: at 07:47

## 2025-04-16 RX ADMIN — ACETAMINOPHEN 1000 MG: 500 TABLET, FILM COATED ORAL at 06:44

## 2025-04-16 RX ADMIN — EPHEDRINE SULFATE 5 MG: 5 INJECTION INTRAVENOUS at 09:41

## 2025-04-16 ASSESSMENT — PAIN SCALES - GENERAL
PAINLEVEL_OUTOF10: 7
PAINLEVEL_OUTOF10: 3
PAINLEVEL_OUTOF10: 8
PAINLEVEL_OUTOF10: 6

## 2025-04-16 ASSESSMENT — LIFESTYLE VARIABLES: SMOKING_STATUS: 0

## 2025-04-16 ASSESSMENT — PAIN - FUNCTIONAL ASSESSMENT: PAIN_FUNCTIONAL_ASSESSMENT: 0-10

## 2025-04-16 NOTE — OP NOTE
Operative Note    Patient:Josh Lr  MRN: [id number]    Date Of Surgery: 4/16/2025    Surgeon: Alex Tinajero MD    Assistant Surgeon: None    Procedure Performed:   right  Lateral ankle reconstruction - ATFL & CFL  Ankle Arthroscopy Extensive  Excision distal fibular nonunion  Peroneal tendon debridement  Excision posterior talus-os trigonum     Pre Op Diagnosis:  Ankle instability  Ankle Synovitis  Painful os trigonum    Post Op Diagnosis:   same    Implants:   Implant Name Type Inv. Item Serial No.  Lot No. LRB No. Used Action   DEVICE GRFT FIX FOR LIGMNT AUG ANCHR REP PEEK INT BRAC - [id number]  DEVICE GRFT FIX FOR LIGMNT AUG ANCHR REP PEEK INT BRAC  [employer] [id number] Right 1 Implanted   ANCHOR SFT TISS BICEPS FIBERTAK - [id number]  ANCHOR SFT TISS BICEPS FIBERTAK  [employer] [id number] Right 1 Implanted   ANCHOR SUT BICEPS IMPL FIBERTAK - [id number]  ANCHOR SUT BICEPS IMPL FIBERTAK  [employer] [id number] Right 1 Implanted       Anesthesia:  General    Blood Loss:  less than 100     Tourniquet:  Estimated calf @ 250 mmHg - 55 minutes    Complications:  -    Pre Operative Abx:   Clindamycin 900mg    Specimens/Cultures:  Implant Name Type Inv. Item Serial No.  Lot No. LRB No. 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  Grade 2 chondral changes noted the posterior lateral ankle significant inflamed ankle joint with a  tenon sheath with a vicryl suture.     I irrigated out the wound with sterile saline-betadine solution, closed the skin with 3-0 monocryl sub q. I then closed the top layer of skin with an interrupted 3 oh vertical mattress nylon for the skin.     A sterile dressing was then applied to the leg and Posterior slab splint with Stirrups.      They were awoken from anesthesia and returned to the PACU without difficulty.    Post Operative Plan:   1- WB status: Non-Weight Bearing   2- Follow Up: 2-3 weeks  3- Immobilization/assistive devices: brace/splint  4- DVT px: ASA 81 mg BID  5- Pain Medication:   At follow-up we will change into cam walker boot, begin range of motion and slowly begin weightbearing.

## 2025-04-16 NOTE — ANESTHESIA POSTPROCEDURE EVALUATION
Department of Anesthesiology  Postprocedure Note    Patient: oJsh Lr  MRN: [id number]  YOB: 2000  Date of evaluation: 4/16/2025    Procedure Summary       Date: 04/16/25 Room / Location: [facility] OP OR 01 / [facility]    Anesthesia Start: 0815 Anesthesia Stop: 1027    Procedure: right ankle scope lateral ankle reconstruction Os trignum Excision (Right: Ankle) Diagnosis:       Ankle fracture, right      (Ankle fracture, right [S82.891A])    Surgeons: Alex Tinajero MD Responsible Provider: Felix Sebastian MD    Anesthesia Type: general, regional ASA Status: 2            Anesthesia Type: No value filed.    Azalea Phase I: Azalea Score: 10    Azalea Phase II: Azalea Score: 10    Anesthesia Post Evaluation    Patient location during evaluation: PACU  Patient participation: complete - patient participated  Level of consciousness: awake and alert  Airway patency: patent  Nausea & Vomiting: no nausea and no vomiting  Cardiovascular status: hemodynamically stable  Respiratory status: acceptable, nonlabored ventilation and spontaneous ventilation  Hydration status: euvolemic  Comments: BP (!) 111/56   Pulse (!) 101   Temp 97.5 °F (36.4 °C)   Resp 18   Ht 1.549 m (5' 1\")   Wt 85.3 kg (188 lb)   LMP  (LMP Unknown) Comment: IUD removed 3/28/25  SpO2 93%   BMI 35.52 kg/m²     Multimodal analgesia pain management approach  Pain management: adequate and satisfactory to patient        No notable events documented.

## 2025-04-16 NOTE — ANESTHESIA PRE PROCEDURE
1 mL  1 mL IntraDERmal Once PRN Felix Sebastian MD        lactated ringers infusion   IntraVENous Continuous Felix Sebastian  mL/hr at 25 0646 New Bag at 25 0646    sodium chloride flush 0.9 % injection 5-40 mL  5-40 mL IntraVENous 2 times per day Felix Sebastian MD        sodium chloride flush 0.9 % injection 5-40 mL  5-40 mL IntraVENous PRN Felix Sebastian MD        0.9 % sodium chloride infusion   IntraVENous PRN Felix Sebastian MD        midazolam PF (VERSED) injection 2 mg  2 mg IntraVENous Once PRN Felix Sebastian MD           Allergies:    Allergies   Allergen Reactions    Latex Rash     Mild, rash       Problem List:    Patient Active Problem List   Diagnosis Code    ADHD (attention deficit hyperactivity disorder) F90.9    Scoliosis M41.9    Anemia, iron deficiency D50.9    ADD (attention deficit disorder) F98.8    Injury of right ankle S99.911A    Avulsion fracture of distal fibula S82.839A    Ankle fracture, right S82.891A       Past Medical History:        Diagnosis Date    ADHD (attention deficit hyperactivity disorder) 2017    Anemia, iron deficiency 2017    H/O seasonal allergies 2017    Headache     History of gestational diabetes     Pre-diabetes     metformin    Pyuria     Scoliosis 2017       Past Surgical History:        Procedure Laterality Date    BACK SURGERY       SECTION      DENTAL SURGERY         Social History:    Social History     Tobacco Use    Smoking status: Never    Smokeless tobacco: Never   Substance Use Topics    Alcohol use: Not Currently                                Counseling given: Not Answered      Vital Signs (Current):   Vitals:    25 1543 25 0630   BP:  115/70   Pulse:  89   Resp:  18   Temp:  98.5 °F (36.9 °C)   TempSrc:  Oral   SpO2:  98%   Weight: 85.3 kg (188 lb) 85.3 kg (188 lb)   Height: 1.549 m (5' 1\")                                               BP Readings from Last 3 Encounters:

## 2025-04-16 NOTE — INTERVAL H&P NOTE
Update History & Physical    The Patient's History and Physical was reviewed   I discussed the surgery and patients medical condition with the patient.  The chart was reviewed with the patient and I examined the patient.    There was no change.  The surgical site was confirmed by the patient and me.    CV: no JVD, normal rate by palpation of radial pulse.  RESP: no stridor, labored respirations, no tachypnea, no wheezing    Plan:  The risk, benefits, expected outcome, and alternative to the recommended procedure have been discussed with the patient.  Patient understands and wants to proceed with the procedure.    Electronically signed by Alex Tinajero MD on 04/16/25 6:47 AM

## 2025-04-16 NOTE — DISCHARGE INSTRUCTIONS
INSTRUCTIONS FOLLOWING FOOT SURGERY  Post Operative Plan:   1- WB status: Non-Weight Bearing   2- Follow Up: 2-3 weeks  3- Immobilization/assistive devices: brace/splint  4- DVT px: ASA 81 mg BID  5- Pain Medication:   At follow-up we will change into cam walker boot, begin range of motion and slowly begin weightbearing.    ACTIVITY  No weight bearing. Use crutches or knee walker until seen in follow up appointment  Nonweightbearing to the affected extremity means you are not allowed to put pressure or any weight on the surgical extremity.  Information regarding scooters, crutches and other assistive devices will have been discussed at your presurgical office visit these devices are to be used until told otherwise by the doctor or nurse practitioner.  Rest when you feel tired. Minimizing activity levels is highly recommended for the first 48 to 72 hours after surgery.  Follow up with MD in 2-3 weeks.  If you have any questions or concerns regarding your surgery or recovery please call our office.    Blood clot prevention:  If the surgery you had requires you to be nonweightbearing, in addition to the narcotic and antibiotic you will also take an 81 mg Aspirin. This will be taken until you are told to discontinue it. An over-the-counter 81 mg aspirin will work.  Get up and out of bed frequently. While in bed move the legs as much as possible.    DRESSING CARE   PLEASE DO NOT GET THE SURGICAL DRESSING WET.  It is recommended using a snug compressive device such as a cast bag to prevent the dressing from getting wet when bathing if you need assistance in any way with this please call our office.  Please make every effort to leave your postoperative dressing that was placed sterilely in the operating room in place until your first postoperative visit.  If bleeding through your bandage occurs you may reinforce the dressing with additional gauze and an Ace wrap.  Keep dressing clean and dry, and after it is removed  continue to keep site clean and dry as able.  Don't put anything into the splint to relieve itching etc.     CALL YOUR DOCTOR IF YOU HAVE  Excessive bleeding that does not stop  Temperature of 101.5 degrees or above.  Redness, excessive swelling or bruising, and/or green or yellow, smelly discharge from incision.  Loss of sensation - cold, white or blue toes.    DIET  Day of Surgery: Clear liquids until no nausea or vomiting; Advance to regular diet. Drink plenty of liquids.  Nausea and vomiting can be common after surgical procedure.  Please ensure you take narcotics (pain medication) with food.  If the symptoms become severe please call our office.    PAIN  Your narcotic (pain medication) and an antibiotic will be sent to the pharmacy listed in your chart.  Both of these medications should be taken as directed on the bottle.   Pain can be hard to manage postoperatively especially if you had an anesthetic nerve block and do not know when it will wear off.  It is recommended that you start taking pain medication even with an anesthetic block the night of surgery.  The anesthetic nerve block can last up to 72 hours postoperatively, your leg will likely be numb as long as the anesthetic block is working.    If you are taking the pain medication as directed on the bottle and your pain is not managed or controlled you may call your MD for direction.   Pain medication may cause constipation, to help minimize this ensure you are drinking plenty of water after surgery.  You may start a stool softener and/or MiraLAX to help alleviate or mitigate this problem.  Please take these over-the-counter products as directed on the bottle.  Elevation of the operative extremity is imperative after surgery.  This will help to decrease swelling, can stop any additional bleeding post surgery and can decrease overall discomfort.  Swelling is a normal finding after surgery and can sometimes be severe elevation is the best and effective way

## 2025-04-16 NOTE — ANESTHESIA PROCEDURE NOTES
Peripheral Block    Patient location during procedure: holding area  Reason for block: post-op pain management and at surgeon's request  Start time: 4/16/2025 7:47 AM  End time: 4/16/2025 7:52 AM  Staffing  Anesthesiologist: Felix Sebastian MD  Performed by: Felix Sebastian MD  Authorized by: Felix Sebastian MD    Preanesthetic Checklist  Completed: patient identified, IV checked, site marked, risks and benefits discussed, surgical/procedural consents, equipment checked, pre-op evaluation, timeout performed, anesthesia consent given, oxygen available, monitors applied/VS acknowledged, fire risk safety assessment completed and verbalized and blood product R/B/A discussed and consented  Peripheral Block   Patient position: supine (Leg on padded elevated pillow)  Prep: ChloraPrep  Provider prep: mask and sterile gloves  Patient monitoring: cardiac monitor, continuous pulse ox, frequent blood pressure checks, IV access, responsive to questions and oxygen  Block type: Ankle  Laterality: right  Injection technique: single-shot  Guidance: ultrasound guided  Local infiltration: lidocaine  Infiltration strength: 1 %  Local infiltration: lidocaine  Dose: 1 mL    Needle   Needle type: short-bevel   Needle gauge: 26 G  Needle localization: anatomical landmarks  Needle length: 8 cm  Assessment   Injection assessment: negative aspiration for heme, no paresthesia on injection, local visualized surrounding nerve on ultrasound and no intravascular symptoms  Paresthesia pain: none  Slow fractionated injection: yes  Hemodynamics: stable    Additional Notes  Time out at 0747  Medications Administered  ROPivacaine (NAROPIN) 0.25% in sodium chloride (Mixture components: ROPivacaine 0.5% Soln, 10 mL; sodium chloride 0.9 % Soln, 10 mL) - Perineural   20 mL - 4/16/2025 7:47:00 AM

## 2025-04-21 ENCOUNTER — OFFICE VISIT (OUTPATIENT)
Dept: ORTHOPEDIC SURGERY | Age: 25
End: 2025-04-21

## 2025-04-21 ENCOUNTER — CLINICAL DOCUMENTATION (OUTPATIENT)
Dept: ORTHOPEDIC SURGERY | Age: 25
End: 2025-04-21

## 2025-04-21 ENCOUNTER — TELEPHONE (OUTPATIENT)
Dept: ORTHOPEDIC SURGERY | Age: 25
End: 2025-04-21

## 2025-04-21 DIAGNOSIS — G89.18 POST-OP PAIN: Primary | ICD-10-CM

## 2025-04-21 NOTE — PROGRESS NOTES
Spoke to patient   She is having a lot of pain in the lateral splint   \" Feels like it is rubbing a raw spot\"   She also states she is having a lot of swelling in the non surgical side.     She would like to come in today for a splint change.   She denies any calf pain or pain going up leg.   She is taking her aspirin BID.     Appointment made today at 1 pm   Patient notified and voiced understandings.

## 2025-04-21 NOTE — PROGRESS NOTES
Patient presents for splint change today.  She states that she felt like a sore was rubbing on her lateral ankle over the incision and it was causing her increased pain.  Splint was taken down and incision sites are clean, dry and intact.  There was a fair amount of dried blood on her dressing and believes that was irritating her incision.  A new well-padded posterior slab with stirrup splint was applied to the right lower extremity.  She will remain nonweightbearing.  She was instructed to contact the clinic should she have any further questions or concerns.  She will follow-up at her regular scheduled follow-up on 5/8/2025.    CLARA Delcid

## 2025-04-22 DIAGNOSIS — G89.18 POST-OP PAIN: Primary | ICD-10-CM

## 2025-04-22 RX ORDER — OXYCODONE HYDROCHLORIDE 5 MG/1
5 TABLET ORAL EVERY 6 HOURS PRN
Qty: 20 TABLET | Refills: 0 | Status: SHIPPED | OUTPATIENT
Start: 2025-04-22 | End: 2025-04-27

## 2025-04-23 ENCOUNTER — OFFICE VISIT (OUTPATIENT)
Dept: ORTHOPEDIC SURGERY | Age: 25
End: 2025-04-23
Payer: COMMERCIAL

## 2025-04-23 DIAGNOSIS — G89.18 POST-OP PAIN: Primary | ICD-10-CM

## 2025-04-23 DIAGNOSIS — S99.911D INJURY OF RIGHT ANKLE, SUBSEQUENT ENCOUNTER: ICD-10-CM

## 2025-04-23 PROCEDURE — L4361 PNEUMA/VAC WALK BOOT PRE OTS: HCPCS | Performed by: PHYSICIAN ASSISTANT

## 2025-04-23 PROCEDURE — 99024 POSTOP FOLLOW-UP VISIT: CPT | Performed by: PHYSICIAN ASSISTANT

## 2025-04-23 NOTE — PROGRESS NOTES
The patient was prescribed a walker boot for the patient's right foot. The patient wears a size NA shoe and I fitted them with a M size boot. The patient was fitted and instructed on the use of prescribed walker boot by a Registered Orthopedic Technician. I explained how to fit themselves and that the plastic flexible piece should always be on the front of the boot and secured by the Velcro straps on top. The air bladder in the boot was adjusted according to proper fit and comfort. The patient walked a short distance and acknowledged satisfaction with current fit. I also explained that they need a heel lift or a higher heeled shoe for the uninvolved LE to help normalize gait and avoid excessive low back stress/strain due to leg length inequality created from walker boot.Patient read and signed documenting they understand and agree to [facility]'s current DME return policy.

## 2025-04-23 NOTE — PROGRESS NOTES
Patient presents back today due to continued splint irritation after a new splint was placed on Monday, 4/21/2025.  Splint was taken down again and incision sites were clean dry and intact and sutures in place.  There were no signs of infection or irritation.  Given her continued irritation with the splint, we will do a sterile redressing of the wound with Xeroform, 4 x 4, ABD and Ace wrap and placed her into a CAM boot.  This is medically necessary for increased stability and functionality.  She is to continue to be strict nonweightbearing and to treat the boot like the splint.  She is not to remove the boot or the sterile dressing of her wound.  She will continue with her aspirin 81 mg twice daily.  She will follow-up at her regular scheduled follow-up on 5/8/2025.  Patient understood and was in full agreement with the treatment plan.    Sara Lopez, PA

## 2025-04-28 ENCOUNTER — OFFICE VISIT (OUTPATIENT)
Dept: ORTHOPEDIC SURGERY | Age: 25
End: 2025-04-28

## 2025-04-28 DIAGNOSIS — G89.18 POST-OP PAIN: Primary | ICD-10-CM

## 2025-04-28 PROCEDURE — 99024 POSTOP FOLLOW-UP VISIT: CPT | Performed by: PHYSICIAN ASSISTANT

## 2025-04-28 RX ORDER — OXYCODONE HYDROCHLORIDE 5 MG/1
5 TABLET ORAL EVERY 6 HOURS PRN
Qty: 20 TABLET | Refills: 0 | Status: SHIPPED | OUTPATIENT
Start: 2025-04-28 | End: 2025-05-03

## 2025-04-28 NOTE — PROGRESS NOTES
Patient presents back today due to getting her boot and postoperative dressing wet in shower.  She states that she had her shower [place] bag on but the water leaked through.  She does not believe that her dressing is wet but the inside of the boot is wet.  She was told to treat this like a splint and not to take it off so she is not taken the boot off to check her dressing.  Boot was taken down and the inner liner was wet.  Her dressing was taken down and the dressing itself was dry.  Incision sites were clean, dry and intact with sutures in place.  A new dressing of Xeroform, 4 x 4 and web roll was applied.  She was given a new boot liner and lined with ABDs.  She will continue wearing the boot as a splint and this will be taken down at her first postop.  A prescription for Oxycodone 5 mg every 6 hours was given today.  Patient understood and was in full agreement with the treatment plan.  She was instructed to contact the clinic should she have any further questions or concerns.    CLARA Delcid

## 2025-04-29 ENCOUNTER — TELEPHONE (OUTPATIENT)
Dept: ORTHOPEDIC SURGERY | Age: 25
End: 2025-04-29

## 2025-04-29 NOTE — TELEPHONE ENCOUNTER
Appointment Request  (Newest Message First)  Josh Lr  [facility]  Staff (supporting CLARA Delcid)23 hours ago (1:29 PM)     HM  Appointment Request From: Josh Lr     [facility] Provider[facility] CLARA Pete [facility]     Preferred Date Range: Any date 4/28/2025 or later     Preferred Times: Any Time     Reason for visit: Request an Appointment     Health Maintenance Topic:      Comments:  Need a later time (3:30)

## 2025-05-06 ENCOUNTER — TELEPHONE (OUTPATIENT)
Dept: ORTHOPEDIC SURGERY | Age: 25
End: 2025-05-06

## 2025-05-06 NOTE — TELEPHONE ENCOUNTER
I won’t have transportation Thursday, trying to see if I can come today or tomorrow please  for her POST O{  Please  call   her

## 2025-05-07 ENCOUNTER — OFFICE VISIT (OUTPATIENT)
Dept: ORTHOPEDIC SURGERY | Age: 25
End: 2025-05-07

## 2025-05-07 DIAGNOSIS — G89.18 POST-OP PAIN: Primary | ICD-10-CM

## 2025-05-07 PROCEDURE — 99024 POSTOP FOLLOW-UP VISIT: CPT | Performed by: PHYSICIAN ASSISTANT

## 2025-05-07 NOTE — PROGRESS NOTES
Name: Josh Lr  YOB: 2000  Gender: female  MRN: [id number]    Plan:    Patient already in CAM boot.  NWB in the boot.  May use this foot for transfers.  May come out of the boot to work on gentle ankle range of motion    Follow up in 3 week(s)without XR -increase WB and begin PT at this time         Procedure: right ankle scope lateral ankle reconstruction Os trignum Excision - Right    Surgery Date: 4/16/2025      Subjective: Denies fevers chills or infection.  Denies any signs of spreading erythema.  Doing well.  Denies any significant pain.    ROS: Patient Denies fever/chills, headache, visual changes, chest pain, shortness of breath, and nausea/vomiting/diarrhea     Exam:     Wounds: appears to be healing well with good reapproximation, healing well , sutures in place and were removed without difficulty    Vascular: BLE: 2+ DP pulse, toes wwp w/ BCR<2s    Imaging:   No imaging reviewed              20 2323   NICU Assessment/Suction   Rhythm/Pattern, Respiratory pattern unlabored   PRE-TX-O2   O2 Device (Oxygen Therapy) room air   SpO2 (!) 100 %   Pulse Oximetry Type Continuous   Pulse 148   Resp 66   Respiratory Evaluation   $ Care Plan Tech Time 15 min   Admitting Diagnosis

## 2025-05-19 DIAGNOSIS — G89.18 POST-OP PAIN: Primary | ICD-10-CM

## 2025-05-19 RX ORDER — HYDROCODONE BITARTRATE AND ACETAMINOPHEN 5; 325 MG/1; MG/1
1 TABLET ORAL EVERY 6 HOURS PRN
Qty: 28 TABLET | Refills: 0 | Status: SHIPPED | OUTPATIENT
Start: 2025-05-19 | End: 2025-05-26

## 2025-05-28 ENCOUNTER — OFFICE VISIT (OUTPATIENT)
Dept: ORTHOPEDIC SURGERY | Age: 25
End: 2025-05-28

## 2025-05-28 DIAGNOSIS — G89.18 POST-OP PAIN: Primary | ICD-10-CM

## 2025-05-28 DIAGNOSIS — R52 PAIN: ICD-10-CM

## 2025-05-28 PROCEDURE — 99024 POSTOP FOLLOW-UP VISIT: CPT | Performed by: PHYSICIAN ASSISTANT

## 2025-05-28 NOTE — PROGRESS NOTES
The patient was prescribed a Wraptor brace for the patient's rightfoot. The patient wears a size 6.5 shoe and I fitted the patient with a M brace. I explained how to fit the brace properly by pulling the lace tabs across top of foot first then under arch and lastly pulling the strap up firmly and attaching to the lateral Velcro strip. Thus forming a figure 8 across the ankle joint. Once the figure 8 is completed they are to secure the top (short circumferential) straps to help avoid the straps from loosening with normal wear.  The patient was able to demonstrate proper fitting in office to ensure compliance with device and acknowledged satisfaction with current fit. Patient read and signed documenting they understand and agree to [facility]'s current DME return policy.

## 2025-05-28 NOTE — PROGRESS NOTES
Name: Josh Lr  YOB: 2000  Gender: female  MRN: [id number]    Plan:    Increase weightbearing in CAM boot then slow transition to ASO brace.  ASO brace given today.  This medically necessary for increased stability and functionality as patient transitions from the boot to the brace  Formal physical therapy    Follow up in 6 week(s)without XR         Procedure: right ankle scope lateral ankle reconstruction Os trignum Excision - Right    Surgery Date: 4/16/2025      Subjective: Denies fevers chills or infection.  Denies any signs of spreading erythema.  Doing well.  Denies any significant pain.  States that she has been ambulating in the boot since last Thursday.    ROS: Patient Denies fever/chills, headache, visual changes, chest pain, shortness of breath, and nausea/vomiting/diarrhea     Exam:     Wounds: appears to be healing well with good reapproximation, healed    Vascular: BLE: 2+ DP pulse, toes wwp w/ BCR<2s    Imaging:   No imaging reviewed

## 2025-05-30 DIAGNOSIS — G89.18 ACUTE POST-OPERATIVE PAIN: Primary | ICD-10-CM

## 2025-05-30 RX ORDER — HYDROCODONE BITARTRATE AND ACETAMINOPHEN 5; 325 MG/1; MG/1
1 TABLET ORAL EVERY 6 HOURS PRN
Qty: 20 TABLET | Refills: 0 | Status: SHIPPED | OUTPATIENT
Start: 2025-05-30 | End: 2025-06-04

## 2025-07-07 NOTE — PROGRESS NOTES
The patient is a 25 y.o.  who is seen to discuss her new pregnancy. Pt denies any current unilateral pain, cramping, urinary symptoms, or vaginal bleeding. She is currently taking an over the counter PNV with DHA and folic acid.     Patient reports she has infected tooth and needs note from our office to allow dental work.  Currently taking amoxil for tooth infection    +n/v, PCP gave zofran 4mg. She is still having n/v, will increase dose to 8mg q8hr.   Rec advance diet as tolerated.     LMP: 2025  GONZALEZ based off of LMP: 2026  GA today: 8w1d    US findings today 2025:  Single IUP noted with a FHR= 162 bpm   CRL is consistent with GONZALEZ determined by LMP 26 8W1D   YS WNL   ROV WNL   LOV WNL, CLC   Uterus anteverted   CX WNL   No free fluid or adnexal mass   No MIRNA       HISTORY:      No LMP recorded (lmp unknown). Patient is pregnant.  Sexual History:  single partner, contraception - none  Contraception:  none  Current Outpatient Medications on File Prior to Visit   Medication Sig Dispense Refill    amoxicillin (AMOXIL) 500 MG capsule Take 1 capsule by mouth 3 times daily      Prenatal MV-Min-Fe Fum-FA-DHA (PRENATAL/FOLIC ACID+DHA PO) Take by mouth      ondansetron (ZOFRAN) 4 MG tablet Take 1 tablet by mouth daily as needed for Nausea or Vomiting 30 tablet 0    docusate sodium (COLACE) 100 MG capsule Take 1 capsule by mouth daily as needed for Constipation (Patient not taking: Reported on 2025) 30 capsule 0    aspirin (ASPIRIN 81) 81 MG EC tablet Take 1 tablet by mouth in the morning and at bedtime for 21 days 42 tablet 0    amphetamine-dextroamphetamine (ADDERALL) 15 MG tablet Take 1 tablet by mouth 2 times daily. (Patient not taking: Reported on 2025)      cyclobenzaprine (FLEXERIL) 10 MG tablet as needed (Patient not taking: Reported on 2025)      metFORMIN (GLUCOPHAGE-XR) 500 MG extended release tablet Take 1 tablet by mouth nightly (Patient not taking: Reported on

## 2025-07-08 ENCOUNTER — OFFICE VISIT (OUTPATIENT)
Dept: OBGYN CLINIC | Age: 25
End: 2025-07-08

## 2025-07-08 ENCOUNTER — PROCEDURE VISIT (OUTPATIENT)
Dept: OBGYN CLINIC | Age: 25
End: 2025-07-08
Payer: COMMERCIAL

## 2025-07-08 VITALS
HEIGHT: 61 IN | BODY MASS INDEX: 36.93 KG/M2 | DIASTOLIC BLOOD PRESSURE: 62 MMHG | SYSTOLIC BLOOD PRESSURE: 108 MMHG | WEIGHT: 195.6 LBS

## 2025-07-08 DIAGNOSIS — N92.6 MISSED MENSES: Primary | ICD-10-CM

## 2025-07-08 DIAGNOSIS — O21.9 NAUSEA AND VOMITING DURING PREGNANCY: ICD-10-CM

## 2025-07-08 LAB
HCG, PREGNANCY, URINE, POC: POSITIVE
VALID INTERNAL CONTROL, POC: YES

## 2025-07-08 PROCEDURE — 76830 TRANSVAGINAL US NON-OB: CPT | Performed by: OBSTETRICS & GYNECOLOGY

## 2025-07-08 RX ORDER — ONDANSETRON 8 MG/1
8 TABLET, FILM COATED ORAL EVERY 8 HOURS PRN
Qty: 30 TABLET | Refills: 2 | Status: SHIPPED | OUTPATIENT
Start: 2025-07-08

## 2025-07-08 RX ORDER — AMOXICILLIN 500 MG/1
500 CAPSULE ORAL 3 TIMES DAILY
COMMUNITY
Start: 2025-07-08 | End: 2025-07-15

## 2025-07-15 ENCOUNTER — HOSPITAL ENCOUNTER (EMERGENCY)
Age: 25
Discharge: HOME | End: 2025-07-15
Attending: EMERGENCY MEDICINE
Payer: COMMERCIAL

## 2025-07-15 VITALS
WEIGHT: 190 LBS | HEIGHT: 61 IN | OXYGEN SATURATION: 100 % | DIASTOLIC BLOOD PRESSURE: 75 MMHG | TEMPERATURE: 98.4 F | BODY MASS INDEX: 35.87 KG/M2 | RESPIRATION RATE: 18 BRPM | SYSTOLIC BLOOD PRESSURE: 123 MMHG | HEART RATE: 85 BPM

## 2025-07-15 DIAGNOSIS — N30.00 ACUTE CYSTITIS WITHOUT HEMATURIA: ICD-10-CM

## 2025-07-15 DIAGNOSIS — O21.0 MILD HYPEREMESIS GRAVIDARUM, ANTEPARTUM: Primary | ICD-10-CM

## 2025-07-15 LAB
ALBUMIN SERPL-MCNC: 3.7 G/DL (ref 3.5–5)
ALBUMIN/GLOB SERPL: 0.9 (ref 1–1.9)
ALP SERPL-CCNC: 120 U/L (ref 35–104)
ALT SERPL-CCNC: 23 U/L (ref 8–45)
ANION GAP SERPL CALC-SCNC: 15 MMOL/L (ref 7–16)
APPEARANCE UR: ABNORMAL
AST SERPL-CCNC: 26 U/L (ref 15–37)
BACTERIA URNS QL MICRO: ABNORMAL /HPF
BASOPHILS # BLD: 0.02 K/UL (ref 0–0.2)
BASOPHILS NFR BLD: 0.3 % (ref 0–2)
BILIRUB SERPL-MCNC: 0.7 MG/DL (ref 0–1.2)
BILIRUB UR QL: ABNORMAL
BUN SERPL-MCNC: 9 MG/DL (ref 6–23)
CALCIUM SERPL-MCNC: 9.8 MG/DL (ref 8.8–10.2)
CASTS URNS QL MICRO: ABNORMAL /LPF
CHLORIDE SERPL-SCNC: 100 MMOL/L (ref 98–107)
CO2 SERPL-SCNC: 21 MMOL/L (ref 20–29)
COLOR UR: ABNORMAL
CREAT SERPL-MCNC: 0.5 MG/DL (ref 0.6–1.1)
DIFFERENTIAL METHOD BLD: ABNORMAL
EOSINOPHIL # BLD: 0.02 K/UL (ref 0–0.8)
EOSINOPHIL NFR BLD: 0.3 % (ref 0.5–7.8)
EPI CELLS #/AREA URNS HPF: ABNORMAL /HPF
ERYTHROCYTE [DISTWIDTH] IN BLOOD BY AUTOMATED COUNT: 14 % (ref 11.9–14.6)
GLOBULIN SER CALC-MCNC: 3.9 G/DL (ref 2.3–3.5)
GLUCOSE SERPL-MCNC: 100 MG/DL (ref 70–99)
GLUCOSE UR STRIP.AUTO-MCNC: NEGATIVE MG/DL
HCG SERPL-ACNC: NORMAL MIU/ML
HCT VFR BLD AUTO: 37.6 % (ref 35.8–46.3)
HGB BLD-MCNC: 12.3 G/DL (ref 11.7–15.4)
HGB UR QL STRIP: NEGATIVE
IMM GRANULOCYTES # BLD AUTO: 0.02 K/UL (ref 0–0.5)
IMM GRANULOCYTES NFR BLD AUTO: 0.3 % (ref 0–5)
KETONES UR QL STRIP.AUTO: 80 MG/DL
LEUKOCYTE ESTERASE UR QL STRIP.AUTO: ABNORMAL
LYMPHOCYTES # BLD: 0.84 K/UL (ref 0.5–4.6)
LYMPHOCYTES NFR BLD: 10.7 % (ref 13–44)
MAGNESIUM SERPL-MCNC: 1.8 MG/DL (ref 1.8–2.4)
MCH RBC QN AUTO: 27.6 PG (ref 26.1–32.9)
MCHC RBC AUTO-ENTMCNC: 32.7 G/DL (ref 31.4–35)
MCV RBC AUTO: 84.3 FL (ref 82–102)
MONOCYTES # BLD: 0.5 K/UL (ref 0.1–1.3)
MONOCYTES NFR BLD: 6.4 % (ref 4–12)
MUCOUS THREADS URNS QL MICRO: ABNORMAL /LPF
NEUTS SEG # BLD: 6.43 K/UL (ref 1.7–8.2)
NEUTS SEG NFR BLD: 82 % (ref 43–78)
NITRITE UR QL STRIP.AUTO: NEGATIVE
NRBC # BLD: 0 K/UL (ref 0–0.2)
OTHER OBSERVATIONS: ABNORMAL
PH UR STRIP: 7 (ref 5–9)
PLATELET # BLD AUTO: 371 K/UL (ref 150–450)
PMV BLD AUTO: 9.7 FL (ref 9.4–12.3)
POTASSIUM SERPL-SCNC: 4.1 MMOL/L (ref 3.5–5.1)
PROT SERPL-MCNC: 7.6 G/DL (ref 6.3–8.2)
PROT UR STRIP-MCNC: 30 MG/DL
RBC # BLD AUTO: 4.46 M/UL (ref 4.05–5.2)
RBC #/AREA URNS HPF: ABNORMAL /HPF
SODIUM SERPL-SCNC: 136 MMOL/L (ref 136–145)
SP GR UR REFRACTOMETRY: 1.03 (ref 1–1.02)
UROBILINOGEN UR QL STRIP.AUTO: 2 EU/DL (ref 0.2–1)
WBC # BLD AUTO: 7.8 K/UL (ref 4.3–11.1)
WBC URNS QL MICRO: ABNORMAL /HPF

## 2025-07-15 PROCEDURE — 99284 EMERGENCY DEPT VISIT MOD MDM: CPT

## 2025-07-15 PROCEDURE — 83735 ASSAY OF MAGNESIUM: CPT

## 2025-07-15 PROCEDURE — 96361 HYDRATE IV INFUSION ADD-ON: CPT

## 2025-07-15 PROCEDURE — 80053 COMPREHEN METABOLIC PANEL: CPT

## 2025-07-15 PROCEDURE — 81001 URINALYSIS AUTO W/SCOPE: CPT

## 2025-07-15 PROCEDURE — 84702 CHORIONIC GONADOTROPIN TEST: CPT

## 2025-07-15 PROCEDURE — 2500000003 HC RX 250 WO HCPCS: Performed by: EMERGENCY MEDICINE

## 2025-07-15 PROCEDURE — 96375 TX/PRO/DX INJ NEW DRUG ADDON: CPT

## 2025-07-15 PROCEDURE — 85025 COMPLETE CBC W/AUTO DIFF WBC: CPT

## 2025-07-15 PROCEDURE — 96374 THER/PROPH/DIAG INJ IV PUSH: CPT

## 2025-07-15 PROCEDURE — 87086 URINE CULTURE/COLONY COUNT: CPT

## 2025-07-15 PROCEDURE — 2580000003 HC RX 258: Performed by: EMERGENCY MEDICINE

## 2025-07-15 PROCEDURE — 6360000002 HC RX W HCPCS: Performed by: EMERGENCY MEDICINE

## 2025-07-15 RX ORDER — 0.9 % SODIUM CHLORIDE 0.9 %
1000 INTRAVENOUS SOLUTION INTRAVENOUS ONCE
Status: COMPLETED | OUTPATIENT
Start: 2025-07-15 | End: 2025-07-15

## 2025-07-15 RX ORDER — PROMETHAZINE HYDROCHLORIDE 12.5 MG/1
12.5 TABLET ORAL 4 TIMES DAILY PRN
Qty: 12 TABLET | Refills: 0 | Status: SHIPPED | OUTPATIENT
Start: 2025-07-15 | End: 2025-07-18

## 2025-07-15 RX ORDER — CEPHALEXIN 500 MG/1
500 CAPSULE ORAL 4 TIMES DAILY
Qty: 28 CAPSULE | Refills: 0 | Status: SHIPPED | OUTPATIENT
Start: 2025-07-15 | End: 2025-07-22

## 2025-07-15 RX ADMIN — FAMOTIDINE 20 MG: 10 INJECTION, SOLUTION INTRAVENOUS at 14:40

## 2025-07-15 RX ADMIN — PROMETHAZINE HYDROCHLORIDE 6.25 MG: 25 INJECTION INTRAMUSCULAR; INTRAVENOUS at 14:47

## 2025-07-15 RX ADMIN — SODIUM CHLORIDE 1000 ML: 0.9 INJECTION, SOLUTION INTRAVENOUS at 14:39

## 2025-07-15 ASSESSMENT — LIFESTYLE VARIABLES
HOW OFTEN DO YOU HAVE A DRINK CONTAINING ALCOHOL: NEVER
HOW OFTEN DO YOU HAVE A DRINK CONTAINING ALCOHOL: NEVER
HOW MANY STANDARD DRINKS CONTAINING ALCOHOL DO YOU HAVE ON A TYPICAL DAY: PATIENT DOES NOT DRINK

## 2025-07-15 ASSESSMENT — PAIN - FUNCTIONAL ASSESSMENT
PAIN_FUNCTIONAL_ASSESSMENT: NONE - DENIES PAIN
PAIN_FUNCTIONAL_ASSESSMENT: NONE - DENIES PAIN

## 2025-07-15 NOTE — ED TRIAGE NOTES
Patient arrives ambulatory to triage with complaint of hyperemesis . Patient was recently upped on her Zofran from 4mg to 8mg however her nausea has not improved. Patient is 9 weeks pregnant.

## 2025-07-15 NOTE — ED PROVIDER NOTES
Emergency Department Provider Note       PCP: No primary care provider on file.   Age: 25 y.o.   Sex: female     DISPOSITION Decision To Discharge 07/15/2025 04:04:33 PM   DISPOSITION CONDITION Stable            ICD-10-CM    1. Mild hyperemesis gravidarum, antepartum  O21.0       2. Acute cystitis without hematuria  N30.00           Medical Decision Making     No clinically significant abnormalities noted on the blood work.  There is evidence possibly of a mild UTI as well as hemoconcentration from dehydration with ketonuria.  I discussed the case with Dr. Cheney who is on-call for the group recommended a few days of Phenergan and for the patient to contact to the OB office for further instructions.     1 acute complicated illness or injury.  Prescription drug management performed.  Shared medical decision making was utilized in creating the patients health plan today.    I independently ordered and reviewed each unique test.                     History     Patient is a G2, P1 gravid female at about 9 weeks estimated gestational age presenting with complaints of hyperemesis.  Patient is being followed by [facility].  She has been started on Zofran orally and then had the dose increased from 4 mg to 8 mg but continues to have nausea with vomiting.  She states that she even if she swallows water she tends to throw up.  She denies any fever or chills or abdominal pain.  No hematemesis is reported.  No diarrhea.  Medical history significant for gestational diabetes.    The history is provided by the patient and medical records.       ROS     Review of Systems   Constitutional:  Negative for chills and fever.   All other systems reviewed and are negative.       Physical Exam     Vitals signs and nursing note reviewed:  Vitals:    07/15/25 1337   BP: 124/81   Pulse: 96   Resp: 18   Temp: 98.2 °F (36.8 °C)   TempSrc: Oral   SpO2: 100%   Weight: 86.2 kg (190 lb)   Height: 1.549 m (5' 1\")      Physical Exam  Vitals and

## 2025-07-17 LAB
BACTERIA SPEC CULT: NORMAL
SERVICE CMNT-IMP: NORMAL

## 2025-07-23 ENCOUNTER — HOSPITAL ENCOUNTER (EMERGENCY)
Age: 25
Discharge: HOME | End: 2025-07-23
Attending: STUDENT IN AN ORGANIZED HEALTH CARE EDUCATION/TRAINING PROGRAM
Payer: COMMERCIAL

## 2025-07-23 VITALS
HEART RATE: 90 BPM | SYSTOLIC BLOOD PRESSURE: 135 MMHG | OXYGEN SATURATION: 99 % | DIASTOLIC BLOOD PRESSURE: 75 MMHG | WEIGHT: 190 LBS | HEIGHT: 62 IN | BODY MASS INDEX: 34.96 KG/M2 | RESPIRATION RATE: 16 BRPM | TEMPERATURE: 97.9 F

## 2025-07-23 DIAGNOSIS — O46.90 VAGINAL BLEEDING IN PREGNANCY: Primary | ICD-10-CM

## 2025-07-23 LAB
ABO + RH BLD: NORMAL
ANION GAP SERPL CALC-SCNC: 13 MMOL/L (ref 7–16)
BASOPHILS # BLD: 0.01 K/UL (ref 0–0.2)
BASOPHILS NFR BLD: 0.2 % (ref 0–2)
BUN SERPL-MCNC: 8 MG/DL (ref 6–23)
CALCIUM SERPL-MCNC: 8.8 MG/DL (ref 8.8–10.2)
CHLORIDE SERPL-SCNC: 102 MMOL/L (ref 98–107)
CO2 SERPL-SCNC: 23 MMOL/L (ref 20–29)
CREAT SERPL-MCNC: 0.45 MG/DL (ref 0.6–1.1)
DIFFERENTIAL METHOD BLD: ABNORMAL
EOSINOPHIL # BLD: 0.04 K/UL (ref 0–0.8)
EOSINOPHIL NFR BLD: 0.8 % (ref 0.5–7.8)
ERYTHROCYTE [DISTWIDTH] IN BLOOD BY AUTOMATED COUNT: 13.6 % (ref 11.9–14.6)
GLUCOSE SERPL-MCNC: 103 MG/DL (ref 70–99)
HCG SERPL-ACNC: NORMAL MIU/ML
HCT VFR BLD AUTO: 32.8 % (ref 35.8–46.3)
HGB BLD-MCNC: 10.9 G/DL (ref 11.7–15.4)
IMM GRANULOCYTES # BLD AUTO: 0.01 K/UL (ref 0–0.5)
IMM GRANULOCYTES NFR BLD AUTO: 0.2 % (ref 0–5)
LYMPHOCYTES # BLD: 1.51 K/UL (ref 0.5–4.6)
LYMPHOCYTES NFR BLD: 29.2 % (ref 13–44)
MCH RBC QN AUTO: 27.5 PG (ref 26.1–32.9)
MCHC RBC AUTO-ENTMCNC: 33.2 G/DL (ref 31.4–35)
MCV RBC AUTO: 82.6 FL (ref 82–102)
MONOCYTES # BLD: 0.36 K/UL (ref 0.1–1.3)
MONOCYTES NFR BLD: 6.9 % (ref 4–12)
NEUTS SEG # BLD: 3.25 K/UL (ref 1.7–8.2)
NEUTS SEG NFR BLD: 62.7 % (ref 43–78)
NRBC # BLD: 0 K/UL (ref 0–0.2)
PLATELET # BLD AUTO: 344 K/UL (ref 150–450)
PMV BLD AUTO: 9.5 FL (ref 9.4–12.3)
POTASSIUM SERPL-SCNC: 3.5 MMOL/L (ref 3.5–5.1)
RBC # BLD AUTO: 3.97 M/UL (ref 4.05–5.2)
SODIUM SERPL-SCNC: 138 MMOL/L (ref 136–145)
WBC # BLD AUTO: 5.2 K/UL (ref 4.3–11.1)

## 2025-07-23 PROCEDURE — 84702 CHORIONIC GONADOTROPIN TEST: CPT

## 2025-07-23 PROCEDURE — 99283 EMERGENCY DEPT VISIT LOW MDM: CPT

## 2025-07-23 PROCEDURE — 80048 BASIC METABOLIC PNL TOTAL CA: CPT

## 2025-07-23 PROCEDURE — 86901 BLOOD TYPING SEROLOGIC RH(D): CPT

## 2025-07-23 PROCEDURE — 85025 COMPLETE CBC W/AUTO DIFF WBC: CPT

## 2025-07-23 PROCEDURE — 86900 BLOOD TYPING SEROLOGIC ABO: CPT

## 2025-07-23 ASSESSMENT — PAIN DESCRIPTION - DESCRIPTORS: DESCRIPTORS: CRAMPING;SORE

## 2025-07-23 ASSESSMENT — PAIN DESCRIPTION - LOCATION: LOCATION: RIB CAGE

## 2025-07-23 ASSESSMENT — PAIN DESCRIPTION - ORIENTATION: ORIENTATION: RIGHT;LOWER

## 2025-07-23 ASSESSMENT — PAIN DESCRIPTION - FREQUENCY: FREQUENCY: INTERMITTENT

## 2025-07-23 ASSESSMENT — PAIN SCALES - GENERAL
PAINLEVEL_OUTOF10: 5
PAINLEVEL_OUTOF10: 7

## 2025-07-23 ASSESSMENT — PAIN - FUNCTIONAL ASSESSMENT
PAIN_FUNCTIONAL_ASSESSMENT: 0-10
PAIN_FUNCTIONAL_ASSESSMENT: 0-10

## 2025-07-23 ASSESSMENT — PAIN DESCRIPTION - PAIN TYPE: TYPE: ACUTE PAIN

## 2025-07-23 NOTE — PROGRESS NOTES
Josh Lr is a 25 y.o.  at 10w2d weeks gestation who is seen for TAB.     Patient messaged in 2025 reporting cramping in her mid-back on the right side (and has left thigh injury from a fall) when moving that subsides with rest. Patient reports that she fell through a board in her porch 2025. She hit her leg but did NOT hit her abdomen.    Also reported mild spotting when wiping starting 2025. Reported taking tylenol for pain without relief and has tried to increase hydration. Patient reported pain to be worsening and spotting to increase  through the day yesterday.  Denies recent intercourse.   She notes her bleeding has stopped today.     She did go to ED yesterday, a bedside US was done, possible movement seen on that scan, see note in chart. Qhcg was drawn which demonstrated a drop, however this is likely due to qhcg plateau due to advanced GA.    US findings today 2025:  Eob ultrasound for TAB two episodes of bleeding   Single IUP + FCA. FHT = 169 BPM   S=D   Dates with US= 10w0d   GONZALEZ 26   Early anatomy appears normal   YS and amnion wnl   Bilateral adnexal regions appear WNL---CL on the left   Cx appears >3cm   No reason seen to explain bleeding.     HISTORY:  Patient went to ED yesterday 2025 secondary to vaginal spotting, no US done as patient did not want to wait for this to be done. Patient had labs done.    hCG 7/15/2025: 216,518  hCG 2025: 128,843    OB History    Para Term  AB Living   2 1 1   1   SAB IAB Ectopic Molar Multiple Live Births        1      # Outcome Date GA Lbr Myles/2nd Weight Sex Type Anes PTL Lv   2 Current            1 Term 10/10/23 37w3d  3.172 kg (6 lb 15.9 oz) F CS-LTranv EPI N JAYSON       Patient's last menstrual period was 2025 (exact date).      ROS:  Feeling well. No dyspnea or chest pain on exertion.  No abdominal pain, change in bowel habits, black or bloody stools.  No urinary tract symptoms. OB ROS: c/o

## 2025-07-24 ENCOUNTER — PROCEDURE VISIT (OUTPATIENT)
Dept: OBGYN CLINIC | Age: 25
End: 2025-07-24
Payer: COMMERCIAL

## 2025-07-24 ENCOUNTER — ROUTINE PRENATAL (OUTPATIENT)
Dept: OBGYN CLINIC | Age: 25
End: 2025-07-24
Payer: COMMERCIAL

## 2025-07-24 VITALS
HEIGHT: 62 IN | SYSTOLIC BLOOD PRESSURE: 114 MMHG | BODY MASS INDEX: 35.31 KG/M2 | DIASTOLIC BLOOD PRESSURE: 64 MMHG | WEIGHT: 191.9 LBS

## 2025-07-24 DIAGNOSIS — O20.0 THREATENED ABORTION IN FIRST TRIMESTER: Primary | ICD-10-CM

## 2025-07-24 DIAGNOSIS — O20.0 THREATENED ABORTION: Primary | ICD-10-CM

## 2025-07-24 DIAGNOSIS — O20.9 VAGINAL BLEEDING AFFECTING EARLY PREGNANCY: ICD-10-CM

## 2025-07-24 PROCEDURE — 99213 OFFICE O/P EST LOW 20 MIN: CPT | Performed by: NURSE PRACTITIONER

## 2025-07-24 PROCEDURE — 76817 TRANSVAGINAL US OBSTETRIC: CPT | Performed by: OBSTETRICS & GYNECOLOGY

## 2025-07-24 RX ORDER — ACETAMINOPHEN 325 MG/1
650 TABLET ORAL EVERY 6 HOURS PRN
COMMUNITY

## 2025-07-24 NOTE — ED PROVIDER NOTES
[facility]  Emergency Department    DISPOSITION Decision To Discharge 07/23/2025 11:41:24 PM     ICD-10-CM    1. Vaginal bleeding in pregnancy  O46.90         New Prescriptions    No medications on file     ED Course     ED Course as of 07/23/25 2342 Wed Jul 23, 2025   2212 25-year-old female approximately 2 weeks gestation presents with vaginal spotting after fall yesterday.  Vitals reassuring.  Soft, nontender.  I have recommended formal TVUS to evaluate baby to rule out miscarriage, however she did not want to wait for that study be performed.  She is agreeable for blood work.  I did perform a bedside ultrasound and see an early IUP with some ? fetal activity.  It is difficult to appreciate heart rate on this bedside ultrasound due to quality of scan,gestational age, and ? fetal activity. [ER]   2341 Labs show decrease in quant from 216k->128k over the past 8 days.  This is concerning for ongoing miscarriage.  Her hemoglobin is 10.9 which she states is around her baseline even though recently was in the 12.  She did use the bathroom on the ER and states her vaginal bleeding has stopped.  She denies any lightheadedness or dizziness.  I once again offered her a more formal ultrasound to evaluate for heartbeat which she has declined.  She has follow-up with her OB in 12 hours and she plans to keep this appointment.  We did discuss that this is most likely a miscarriage and she will need a formal ultrasound as well.  Discussed conservative management and progression of likely miscarriage.  Strict return precautions given.  She feels comfortable with this plan [ER]      ED Course User Index  [ER] Apolinar De Los Santos MD     Data Reviewed and Analyzed:  1 or more acute illnesses that pose a threat to life or bodily function.   Shared medical decision making was utilized in creating the patients health plan today.  I independently ordered and reviewed each unique test.    I reviewed external

## 2025-07-24 NOTE — ED NOTES
Patient mobility status ambulates with no difficulty.     I have reviewed discharge instructions with the patient.  The patient verbalized understanding.    Patient left ED via Discharge Method: ambulatory to Home with Significant Other.    Opportunity for questions and clarification provided.     Patient given 0 scripts.            Kings Martinez RN  07/23/25 [id number]

## 2025-07-24 NOTE — ED TRIAGE NOTES
Pt arrived via POV accompanied by her boyfriend. She is ambulatory to triage c/o cramping in right flank, lower ribs area that hurts worse with deep breathing. And she is spotting. She reports being 10 weeks pregnant. She has an emergency OB apt tomorrow at 1030 am.

## 2025-07-24 NOTE — DISCHARGE INSTRUCTIONS
Please follow-up with your OB/GYN first thing in the morning.  Return to the ER if any new or worsening symptoms

## 2025-07-29 ENCOUNTER — INITIAL PRENATAL (OUTPATIENT)
Dept: OBGYN CLINIC | Age: 25
End: 2025-07-29

## 2025-07-29 VITALS — BODY MASS INDEX: 35.87 KG/M2 | HEIGHT: 62 IN | WEIGHT: 194.9 LBS

## 2025-07-29 DIAGNOSIS — Z34.81 ENCOUNTER FOR SUPERVISION OF OTHER NORMAL PREGNANCY, FIRST TRIMESTER: ICD-10-CM

## 2025-07-29 DIAGNOSIS — Z3A.11 11 WEEKS GESTATION OF PREGNANCY: ICD-10-CM

## 2025-07-29 DIAGNOSIS — Z83.3: ICD-10-CM

## 2025-07-29 DIAGNOSIS — Z34.81 PRENATAL CARE, SUBSEQUENT PREGNANCY, FIRST TRIMESTER: Primary | ICD-10-CM

## 2025-07-29 DIAGNOSIS — Z34.81 PRENATAL CARE, SUBSEQUENT PREGNANCY, FIRST TRIMESTER: ICD-10-CM

## 2025-07-29 DIAGNOSIS — O99.210 OBESITY IN PREGNANCY: ICD-10-CM

## 2025-07-29 DIAGNOSIS — Z98.891 HISTORY OF C-SECTION: ICD-10-CM

## 2025-07-29 PROBLEM — F90.9 ADHD (ATTENTION DEFICIT HYPERACTIVITY DISORDER): Status: RESOLVED | Noted: 2017-01-19 | Resolved: 2025-07-29

## 2025-07-29 PROBLEM — D50.9 ANEMIA, IRON DEFICIENCY: Status: RESOLVED | Noted: 2017-01-19 | Resolved: 2025-07-29

## 2025-07-29 PROBLEM — S99.911A INJURY OF RIGHT ANKLE: Status: RESOLVED | Noted: 2025-04-01 | Resolved: 2025-07-29

## 2025-07-29 PROBLEM — G89.18 POST-OP PAIN: Status: RESOLVED | Noted: 2025-04-21 | Resolved: 2025-07-29

## 2025-07-29 PROBLEM — S82.891A ANKLE FRACTURE, RIGHT: Status: RESOLVED | Noted: 2025-04-01 | Resolved: 2025-07-29

## 2025-07-29 PROBLEM — F98.8 ADD (ATTENTION DEFICIT DISORDER): Status: RESOLVED | Noted: 2017-01-19 | Resolved: 2025-07-29

## 2025-07-29 PROBLEM — S82.839A AVULSION FRACTURE OF DISTAL FIBULA: Status: RESOLVED | Noted: 2025-04-01 | Resolved: 2025-07-29

## 2025-07-29 PROBLEM — Z34.90 PREGNANCY: Status: ACTIVE | Noted: 2025-07-29

## 2025-07-29 LAB
ABO + RH BLD: NORMAL
BASOPHILS # BLD: 0.01 K/UL (ref 0–0.2)
BASOPHILS NFR BLD: 0.1 % (ref 0–2)
BLOOD GROUP ANTIBODIES SERPL: NORMAL
DIFFERENTIAL METHOD BLD: ABNORMAL
EOSINOPHIL # BLD: 0.1 K/UL (ref 0–0.8)
EOSINOPHIL NFR BLD: 1.4 % (ref 0.5–7.8)
ERYTHROCYTE [DISTWIDTH] IN BLOOD BY AUTOMATED COUNT: 13.6 % (ref 11.9–14.6)
EST. AVERAGE GLUCOSE BLD GHB EST-MCNC: 111 MG/DL
HBA1C MFR BLD: 5.5 % (ref 0–5.6)
HCT VFR BLD AUTO: 34.2 % (ref 35.8–46.3)
HGB BLD-MCNC: 11.2 G/DL (ref 11.7–15.4)
HIV 1+2 AB+HIV1 P24 AG SERPL QL IA: NONREACTIVE
HIV 1/2 RESULT COMMENT: NORMAL
IMM GRANULOCYTES # BLD AUTO: 0.03 K/UL (ref 0–0.5)
IMM GRANULOCYTES NFR BLD AUTO: 0.4 % (ref 0–5)
LYMPHOCYTES # BLD: 1.6 K/UL (ref 0.5–4.6)
LYMPHOCYTES NFR BLD: 22.4 % (ref 13–44)
MCH RBC QN AUTO: 27.1 PG (ref 26.1–32.9)
MCHC RBC AUTO-ENTMCNC: 32.7 G/DL (ref 31.4–35)
MCV RBC AUTO: 82.6 FL (ref 82–102)
MONOCYTES # BLD: 0.42 K/UL (ref 0.1–1.3)
MONOCYTES NFR BLD: 5.9 % (ref 4–12)
NEUTS SEG # BLD: 4.98 K/UL (ref 1.7–8.2)
NEUTS SEG NFR BLD: 69.8 % (ref 43–78)
NRBC # BLD: 0 K/UL (ref 0–0.2)
PLATELET # BLD AUTO: 420 K/UL (ref 150–450)
PMV BLD AUTO: 9.8 FL (ref 9.4–12.3)
RBC # BLD AUTO: 4.14 M/UL (ref 4.05–5.2)
RUBV IGG SERPL IA-ACNC: 2.09 IU/ML
T PALLIDUM AB SER QL IA: NONREACTIVE
WBC # BLD AUTO: 7.1 K/UL (ref 4.3–11.1)

## 2025-07-29 NOTE — PROGRESS NOTES
Josh LrG2P1 presents today for nob talk.  EDC 26 based off of LMP.  Patient is currently 11 weeks 1 days pregnant.    Patient education was discussed in depth and OB book reviewed with patient.  [facility] policies reviewed with patient.    Genetic testing discussed in depth with patient.  Patient elects NIPT.    Past Medical History:  Patient Active Problem List   Diagnosis    Scoliosis    History of insulin dependent diabetes mellitus in mother    History of     Obesity in pregnancy    Pregnancy      Patient c/o:  none  Return 1-2 weeks for nob exam.    All questions answered patient voiced full understanding, consents signed.  Patient encouraged to call with questions or concerns.

## 2025-07-30 LAB
HBV SURFACE AG SERPL QL IA: NEGATIVE
HCV AB SERPL QL IA: NORMAL
HCV IGG SERPL QL IA: NON REACTIVE S/CO RATIO
VZV IGG SER IA-ACNC: NON REACTIVE

## 2025-07-31 PROBLEM — O09.899 RUBELLA NON-IMMUNE STATUS, ANTEPARTUM: Status: ACTIVE | Noted: 2025-07-31

## 2025-07-31 PROBLEM — O09.899 MATERNAL VARICELLA, NON-IMMUNE: Status: ACTIVE | Noted: 2025-07-31

## 2025-07-31 PROBLEM — Z28.39 MATERNAL VARICELLA, NON-IMMUNE: Status: ACTIVE | Noted: 2025-07-31

## 2025-07-31 PROBLEM — Z28.39 RUBELLA NON-IMMUNE STATUS, ANTEPARTUM: Status: ACTIVE | Noted: 2025-07-31

## 2025-07-31 LAB
BACTERIA SPEC CULT: NORMAL
SERVICE CMNT-IMP: NORMAL

## 2025-08-04 LAB — HGB FRACT BLD ELPH-IMP: NORMAL

## 2025-08-05 LAB
Lab: NORMAL
NTRA FETAL FRACTION: NORMAL
NTRA GENDER OF FETUS: NORMAL
NTRA MONOSOMY X AGE-BASED RISK TEXT: NORMAL
NTRA MONOSOMY X RESULT TEXT: NORMAL
NTRA MONOSOMY X RISK SCORE TEXT: NORMAL
NTRA TRIPLOIDY RESULT TEXT: NORMAL
NTRA TRISOMY 13 AGE-BASED RISK TEXT: NORMAL
NTRA TRISOMY 13 RESULT TEXT: NORMAL
NTRA TRISOMY 13 RISK SCORE TEXT: NORMAL
NTRA TRISOMY 18 AGE-BASED RISK TEXT: NORMAL
NTRA TRISOMY 18 RESULT TEXT: NORMAL
NTRA TRISOMY 18 RISK SCORE TEXT: NORMAL
NTRA TRISOMY 21 AGE-BASED RISK TEXT: NORMAL
NTRA TRISOMY 21 RESULT TEXT: NORMAL
NTRA TRISOMY 21 RISK SCORE TEXT: NORMAL

## 2025-08-19 ENCOUNTER — ROUTINE PRENATAL (OUTPATIENT)
Dept: OBGYN CLINIC | Age: 25
End: 2025-08-19
Payer: COMMERCIAL

## 2025-08-19 VITALS — SYSTOLIC BLOOD PRESSURE: 104 MMHG | DIASTOLIC BLOOD PRESSURE: 68 MMHG | BODY MASS INDEX: 35.98 KG/M2 | WEIGHT: 196.7 LBS

## 2025-08-19 DIAGNOSIS — Z3A.14 14 WEEKS GESTATION OF PREGNANCY: ICD-10-CM

## 2025-08-19 DIAGNOSIS — Z13.89 SCREENING FOR GENITOURINARY CONDITION: ICD-10-CM

## 2025-08-19 DIAGNOSIS — Z34.92 PRENATAL CARE, SECOND TRIMESTER: Primary | ICD-10-CM

## 2025-08-19 DIAGNOSIS — Z11.3 SCREENING EXAMINATION FOR STD (SEXUALLY TRANSMITTED DISEASE): ICD-10-CM

## 2025-08-19 LAB
GLUCOSE URINE, POC: NEGATIVE
PROTEIN,URINE, POC: NEGATIVE

## 2025-08-19 PROCEDURE — 99204 OFFICE O/P NEW MOD 45 MIN: CPT | Performed by: OBSTETRICS & GYNECOLOGY

## 2025-08-22 LAB
C TRACH RRNA SPEC QL NAA+PROBE: NEGATIVE
N GONORRHOEA RRNA SPEC QL NAA+PROBE: NEGATIVE
SPECIMEN SOURCE: NORMAL
T VAGINALIS RRNA SPEC QL NAA+PROBE: NEGATIVE

## (undated) DEVICE — STERILE PVP: Brand: MEDLINE INDUSTRIES, INC.

## (undated) DEVICE — FOOT ANKLE PACK: Brand: MEDLINE INDUSTRIES, INC.

## (undated) DEVICE — PADDING CAST COHESIVE 4 YDX3 IN HND TEARABLE COTTON SPEC 100

## (undated) DEVICE — NEEDLE SPNL L3.5IN PNK HUB S STL REG WALL FIT STYL W/ QNCKE

## (undated) DEVICE — SHEET,DRAPE,53X77,STERILE: Brand: MEDLINE

## (undated) DEVICE — BANDAGE,ELASTIC,ESMARK,STERILE,4"X9',LF: Brand: MEDLINE

## (undated) DEVICE — PADDING CAST W6INXL4YD ST COT COHESIVE HND TEARABLE SPEC

## (undated) DEVICE — DISSECTOR ENDOSCP L7MM DIA3MM FOR RESECT SM JT COOLCUT

## (undated) DEVICE — GLOVE ORTHO 8   MSG9480

## (undated) DEVICE — GLOVE SURG SZ 8 L12IN FNGR THK79MIL GRN LTX FREE

## (undated) DEVICE — INTENDED FOR TISSUE SEPARATION, AND OTHER PROCEDURES THAT REQUIRE A SHARP SURGICAL BLADE TO PUNCTURE OR CUT.: Brand: BARD-PARKER ® STAINLESS STEEL BLADES

## (undated) DEVICE — CLOTH PRE OP W9XL10.5IN 2% CHG FRAGRANCE RNS FREE READYPREP

## (undated) DEVICE — TUBING PMP L16FT MAIN DISP FOR AR-6400 AR-6475 Â€“ ORDER MULTIPLES OF 10 EACH

## (undated) DEVICE — SPLINT THMB W5XL30IN FBRGLS PD PRECUT LTWT DURABLE FAST SET

## (undated) DEVICE — GLOVE ORANGE PI 7 1/2   MSG9075

## (undated) DEVICE — PAD,ABDOMINAL,5"X9",ST,LF,25/BX: Brand: MEDLINE INDUSTRIES, INC.

## (undated) DEVICE — SOL IRR SOD CHL 0.9% TITAN XL CNTNR 3000ML

## (undated) DEVICE — GOWN,SIRUS,NONRNF,SETINSLV,XL,20/CS: Brand: MEDLINE

## (undated) DEVICE — DRAPE C ARM W/ POLY STRP W42XL72IN FOR MOB XR

## (undated) DEVICE — GLOVE SURG SZ 85 L12IN FNGR ORTHO 126MIL CRM LTX FREE